# Patient Record
Sex: FEMALE | Race: WHITE | NOT HISPANIC OR LATINO | Employment: FULL TIME | URBAN - METROPOLITAN AREA
[De-identification: names, ages, dates, MRNs, and addresses within clinical notes are randomized per-mention and may not be internally consistent; named-entity substitution may affect disease eponyms.]

---

## 2017-02-24 ENCOUNTER — AMBULATORY - HEALTHEAST (OUTPATIENT)
Dept: NURSING | Facility: CLINIC | Age: 37
End: 2017-02-24

## 2017-02-24 DIAGNOSIS — Z23 NEED FOR INFLUENZA VACCINATION: ICD-10-CM

## 2017-03-17 ENCOUNTER — OFFICE VISIT - HEALTHEAST (OUTPATIENT)
Dept: FAMILY MEDICINE | Facility: CLINIC | Age: 37
End: 2017-03-17

## 2017-03-17 DIAGNOSIS — S29.011A INTERCOSTAL MUSCLE STRAIN: ICD-10-CM

## 2017-03-17 ASSESSMENT — MIFFLIN-ST. JEOR: SCORE: 1227.88

## 2017-12-13 ENCOUNTER — AMBULATORY - HEALTHEAST (OUTPATIENT)
Dept: NURSING | Facility: CLINIC | Age: 37
End: 2017-12-13

## 2017-12-13 DIAGNOSIS — Z23 NEED FOR INFLUENZA VACCINATION: ICD-10-CM

## 2018-04-17 ENCOUNTER — OFFICE VISIT - HEALTHEAST (OUTPATIENT)
Dept: FAMILY MEDICINE | Facility: CLINIC | Age: 38
End: 2018-04-17

## 2018-04-17 DIAGNOSIS — G43.009 MIGRAINE WITHOUT AURA AND WITHOUT STATUS MIGRAINOSUS, NOT INTRACTABLE: ICD-10-CM

## 2018-04-17 DIAGNOSIS — Z13.220 SCREENING, LIPID: ICD-10-CM

## 2018-04-17 DIAGNOSIS — S29.011A INTERCOSTAL MUSCLE STRAIN: ICD-10-CM

## 2018-04-17 DIAGNOSIS — Z13.0 SCREENING, ANEMIA, DEFICIENCY, IRON: ICD-10-CM

## 2018-04-17 DIAGNOSIS — D18.00 HEMANGIOMA: ICD-10-CM

## 2018-04-17 DIAGNOSIS — Z13.1 SCREENING FOR DIABETES MELLITUS: ICD-10-CM

## 2018-04-17 DIAGNOSIS — Z13.0 SCREENING FOR ENDOCRINE, NUTRITIONAL, METABOLIC AND IMMUNITY DISORDER: ICD-10-CM

## 2018-04-17 DIAGNOSIS — L91.8 CUTANEOUS SKIN TAGS: ICD-10-CM

## 2018-04-17 DIAGNOSIS — J01.00 ACUTE NON-RECURRENT MAXILLARY SINUSITIS: ICD-10-CM

## 2018-04-17 DIAGNOSIS — Z13.228 SCREENING FOR METABOLIC DISORDER: ICD-10-CM

## 2018-04-17 DIAGNOSIS — Z00.00 PREVENTATIVE HEALTH CARE: ICD-10-CM

## 2018-04-17 DIAGNOSIS — Z13.21 SCREENING FOR ENDOCRINE, NUTRITIONAL, METABOLIC AND IMMUNITY DISORDER: ICD-10-CM

## 2018-04-17 DIAGNOSIS — Z13.228 SCREENING FOR ENDOCRINE, NUTRITIONAL, METABOLIC AND IMMUNITY DISORDER: ICD-10-CM

## 2018-04-17 DIAGNOSIS — Z13.29 SCREENING FOR THYROID DISORDER: ICD-10-CM

## 2018-04-17 DIAGNOSIS — N39.46 URGE AND STRESS INCONTINENCE: ICD-10-CM

## 2018-04-17 DIAGNOSIS — R53.82 CHRONIC FATIGUE: ICD-10-CM

## 2018-04-17 DIAGNOSIS — Z23 NEED FOR TDAP VACCINATION: ICD-10-CM

## 2018-04-17 DIAGNOSIS — R06.2 WHEEZING: ICD-10-CM

## 2018-04-17 DIAGNOSIS — Z13.29 SCREENING FOR ENDOCRINE, NUTRITIONAL, METABOLIC AND IMMUNITY DISORDER: ICD-10-CM

## 2018-04-17 ASSESSMENT — MIFFLIN-ST. JEOR: SCORE: 1259.63

## 2018-04-17 NOTE — ASSESSMENT & PLAN NOTE
There are 4 skin tags noted today.  See exam.  I have recommended she come in for removal of these since they are getting caught on her clothing and when she is shaving.

## 2018-04-17 NOTE — ASSESSMENT & PLAN NOTE
Severe pain behind the eyes, notes a few times a month.  Notes tight shoulder muscles are triggers - starts in shoulders and wraps up head.     Naproxen with caffeine helps.

## 2018-04-17 NOTE — ASSESSMENT & PLAN NOTE
Flu shot - gets this every fall.   Pap: done 7/2014- repeat 2019 no hx of hpv. Neg hpv noted 2014  Mammo:  There is no family or personal history, not indicated  - adopted  Colonoscopy:  There is no family or personal history, not indicated   - adopted  Std testing desired:  offered  Osteoporosis prevention discussed.  vitamin d levels ordered. Recommend daily calcium and vitamin d intake to keep good bone health. Recommend weight bearing exercise, no tobacco, and limit alcohol  dexa - no indication age too young.   Recommend sunscreen, exercise, & healthy diet.  Offered tsh, glucose, hgb, lipid  I have had an Advance Directives discussion with the patient.   Body mass index is 21.97 kg/(m^2).   mychart active.

## 2018-05-03 ENCOUNTER — COMMUNICATION - HEALTHEAST (OUTPATIENT)
Dept: FAMILY MEDICINE | Facility: CLINIC | Age: 38
End: 2018-05-03

## 2018-05-11 ENCOUNTER — COMMUNICATION - HEALTHEAST (OUTPATIENT)
Dept: FAMILY MEDICINE | Facility: CLINIC | Age: 38
End: 2018-05-11

## 2018-07-02 ENCOUNTER — COMMUNICATION - HEALTHEAST (OUTPATIENT)
Dept: HEALTH INFORMATION MANAGEMENT | Facility: CLINIC | Age: 38
End: 2018-07-02

## 2018-07-02 ENCOUNTER — COMMUNICATION - HEALTHEAST (OUTPATIENT)
Dept: TELEHEALTH | Facility: CLINIC | Age: 38
End: 2018-07-02

## 2018-08-07 ENCOUNTER — COMMUNICATION - HEALTHEAST (OUTPATIENT)
Dept: SCHEDULING | Facility: CLINIC | Age: 38
End: 2018-08-07

## 2018-09-14 ENCOUNTER — AMBULATORY - HEALTHEAST (OUTPATIENT)
Dept: NURSING | Facility: CLINIC | Age: 38
End: 2018-09-14

## 2018-09-14 DIAGNOSIS — Z23 IMMUNIZATION DUE: ICD-10-CM

## 2019-07-30 ENCOUNTER — OFFICE VISIT - HEALTHEAST (OUTPATIENT)
Dept: FAMILY MEDICINE | Facility: CLINIC | Age: 39
End: 2019-07-30

## 2019-07-30 DIAGNOSIS — Z13.21 SCREENING FOR ENDOCRINE, NUTRITIONAL, METABOLIC AND IMMUNITY DISORDER: ICD-10-CM

## 2019-07-30 DIAGNOSIS — Z13.29 SCREENING FOR ENDOCRINE, NUTRITIONAL, METABOLIC AND IMMUNITY DISORDER: ICD-10-CM

## 2019-07-30 DIAGNOSIS — R10.2 PELVIC PAIN IN FEMALE: ICD-10-CM

## 2019-07-30 DIAGNOSIS — N39.46 URGE AND STRESS INCONTINENCE: ICD-10-CM

## 2019-07-30 DIAGNOSIS — Z13.0 SCREENING, ANEMIA, DEFICIENCY, IRON: ICD-10-CM

## 2019-07-30 DIAGNOSIS — Z12.31 ENCOUNTER FOR SCREENING MAMMOGRAM FOR MALIGNANT NEOPLASM OF BREAST: ICD-10-CM

## 2019-07-30 DIAGNOSIS — Z13.228 SCREENING FOR ENDOCRINE, NUTRITIONAL, METABOLIC AND IMMUNITY DISORDER: ICD-10-CM

## 2019-07-30 DIAGNOSIS — G44.209 TENSION HEADACHE: ICD-10-CM

## 2019-07-30 DIAGNOSIS — R53.82 CHRONIC FATIGUE: ICD-10-CM

## 2019-07-30 DIAGNOSIS — Z13.228 SCREENING FOR METABOLIC DISORDER: ICD-10-CM

## 2019-07-30 DIAGNOSIS — Z13.0 SCREENING FOR ENDOCRINE, NUTRITIONAL, METABOLIC AND IMMUNITY DISORDER: ICD-10-CM

## 2019-07-30 DIAGNOSIS — L91.8 CUTANEOUS SKIN TAGS: ICD-10-CM

## 2019-07-30 DIAGNOSIS — M79.643 PAIN OF HAND, UNSPECIFIED LATERALITY: ICD-10-CM

## 2019-07-30 DIAGNOSIS — Z12.4 SCREENING FOR MALIGNANT NEOPLASM OF CERVIX: ICD-10-CM

## 2019-07-30 DIAGNOSIS — Z13.220 SCREENING, LIPID: ICD-10-CM

## 2019-07-30 DIAGNOSIS — Z11.4 SCREENING FOR HIV WITHOUT PRESENCE OF RISK FACTORS: ICD-10-CM

## 2019-07-30 ASSESSMENT — MIFFLIN-ST. JEOR: SCORE: 1269.83

## 2019-07-30 NOTE — ASSESSMENT & PLAN NOTE
Skin tags which are bothersome noted in the following areas right neck( catches on necklace),  bra line (catches on bra strap), waistband line (catches on pant waist band), right groin (nicks when shaving), and left axilla (nicks when shaving)    Recommended excision, will make appt. For skin tag removal procedure.

## 2019-07-30 NOTE — ASSESSMENT & PLAN NOTE
Still present, wants to check routine labs for now, no further intervention requested today, follow up if desired.

## 2019-07-30 NOTE — ASSESSMENT & PLAN NOTE
No longer getting migraines, feels they are more tension headaches now, milder.  Recommended physical therapy, but she declined for now. Will watch and wait per her preference.

## 2019-07-31 ENCOUNTER — AMBULATORY - HEALTHEAST (OUTPATIENT)
Dept: LAB | Facility: CLINIC | Age: 39
End: 2019-07-31

## 2019-07-31 DIAGNOSIS — Z13.0 SCREENING FOR ENDOCRINE, NUTRITIONAL, METABOLIC AND IMMUNITY DISORDER: ICD-10-CM

## 2019-07-31 DIAGNOSIS — Z13.29 SCREENING FOR ENDOCRINE, NUTRITIONAL, METABOLIC AND IMMUNITY DISORDER: ICD-10-CM

## 2019-07-31 DIAGNOSIS — Z13.220 SCREENING, LIPID: ICD-10-CM

## 2019-07-31 DIAGNOSIS — Z13.21 SCREENING FOR ENDOCRINE, NUTRITIONAL, METABOLIC AND IMMUNITY DISORDER: ICD-10-CM

## 2019-07-31 DIAGNOSIS — Z13.228 SCREENING FOR ENDOCRINE, NUTRITIONAL, METABOLIC AND IMMUNITY DISORDER: ICD-10-CM

## 2019-07-31 DIAGNOSIS — Z13.0 SCREENING, ANEMIA, DEFICIENCY, IRON: ICD-10-CM

## 2019-07-31 DIAGNOSIS — Z13.228 SCREENING FOR METABOLIC DISORDER: ICD-10-CM

## 2019-07-31 LAB
ALBUMIN SERPL-MCNC: 4.1 G/DL (ref 3.5–5)
ALP SERPL-CCNC: 58 U/L (ref 45–120)
ALT SERPL W P-5'-P-CCNC: 9 U/L (ref 0–45)
ANION GAP SERPL CALCULATED.3IONS-SCNC: 9 MMOL/L (ref 5–18)
AST SERPL W P-5'-P-CCNC: 14 U/L (ref 0–40)
BASOPHILS # BLD AUTO: 0 THOU/UL (ref 0–0.2)
BASOPHILS NFR BLD AUTO: 0 % (ref 0–2)
BILIRUB SERPL-MCNC: 0.9 MG/DL (ref 0–1)
BKR LAB AP ABNORMAL BLEEDING: NO
BKR LAB AP BIRTH CONTROL/HORMONES: NORMAL
BKR LAB AP CERVICAL APPEARANCE: NORMAL
BKR LAB AP GYN ADEQUACY: NORMAL
BKR LAB AP GYN INTERPRETATION: NORMAL
BKR LAB AP HPV REFLEX: NORMAL
BKR LAB AP LMP: NORMAL
BKR LAB AP PATIENT STATUS: NORMAL
BKR LAB AP PREVIOUS ABNORMAL: NORMAL
BKR LAB AP PREVIOUS NORMAL: 2014
BUN SERPL-MCNC: 13 MG/DL (ref 8–22)
CALCIUM SERPL-MCNC: 9.8 MG/DL (ref 8.5–10.5)
CHLORIDE BLD-SCNC: 106 MMOL/L (ref 98–107)
CHOLEST SERPL-MCNC: 191 MG/DL
CO2 SERPL-SCNC: 23 MMOL/L (ref 22–31)
CREAT SERPL-MCNC: 0.75 MG/DL (ref 0.6–1.1)
EOSINOPHIL # BLD AUTO: 0.2 THOU/UL (ref 0–0.4)
EOSINOPHIL NFR BLD AUTO: 3 % (ref 0–6)
ERYTHROCYTE [DISTWIDTH] IN BLOOD BY AUTOMATED COUNT: 11.3 % (ref 11–14.5)
FASTING STATUS PATIENT QL REPORTED: YES
GFR SERPL CREATININE-BSD FRML MDRD: >60 ML/MIN/1.73M2
GLUCOSE BLD-MCNC: 85 MG/DL (ref 70–125)
HCT VFR BLD AUTO: 41.6 % (ref 35–47)
HDLC SERPL-MCNC: 71 MG/DL
HGB BLD-MCNC: 14.2 G/DL (ref 12–16)
HIGH RISK?: NO
HIV 1+2 AB+HIV1 P24 AG SERPL QL IA: NEGATIVE
LDLC SERPL CALC-MCNC: 101 MG/DL
LYMPHOCYTES # BLD AUTO: 1.6 THOU/UL (ref 0.8–4.4)
LYMPHOCYTES NFR BLD AUTO: 26 % (ref 20–40)
MCH RBC QN AUTO: 29.6 PG (ref 27–34)
MCHC RBC AUTO-ENTMCNC: 34.2 G/DL (ref 32–36)
MCV RBC AUTO: 87 FL (ref 80–100)
MONOCYTES # BLD AUTO: 0.3 THOU/UL (ref 0–0.9)
MONOCYTES NFR BLD AUTO: 5 % (ref 2–10)
NEUTROPHILS # BLD AUTO: 4.2 THOU/UL (ref 2–7.7)
NEUTROPHILS NFR BLD AUTO: 66 % (ref 50–70)
PATH REPORT.COMMENTS IMP SPEC: NORMAL
PLATELET # BLD AUTO: 200 THOU/UL (ref 140–440)
PMV BLD AUTO: 6.9 FL (ref 7–10)
POTASSIUM BLD-SCNC: 4.4 MMOL/L (ref 3.5–5)
PROT SERPL-MCNC: 7.1 G/DL (ref 6–8)
RBC # BLD AUTO: 4.8 MILL/UL (ref 3.8–5.4)
RESULT FLAG (HE HISTORICAL CONVERSION): NORMAL
SODIUM SERPL-SCNC: 138 MMOL/L (ref 136–145)
TRIGL SERPL-MCNC: 93 MG/DL
WBC: 6.3 THOU/UL (ref 4–11)

## 2019-08-01 ENCOUNTER — COMMUNICATION - HEALTHEAST (OUTPATIENT)
Dept: FAMILY MEDICINE | Facility: CLINIC | Age: 39
End: 2019-08-01

## 2019-08-01 DIAGNOSIS — E55.9 VITAMIN D DEFICIENCY: ICD-10-CM

## 2019-08-01 LAB
25(OH)D3 SERPL-MCNC: 27.5 NG/ML (ref 30–80)
25(OH)D3 SERPL-MCNC: 27.5 NG/ML (ref 30–80)

## 2019-08-06 ENCOUNTER — AMBULATORY - HEALTHEAST (OUTPATIENT)
Dept: FAMILY MEDICINE | Facility: CLINIC | Age: 39
End: 2019-08-06

## 2019-08-06 DIAGNOSIS — L91.8 SKIN TAG: ICD-10-CM

## 2019-08-06 ASSESSMENT — MIFFLIN-ST. JEOR: SCORE: 1261.67

## 2019-10-22 ENCOUNTER — OFFICE VISIT - HEALTHEAST (OUTPATIENT)
Dept: FAMILY MEDICINE | Facility: CLINIC | Age: 39
End: 2019-10-22

## 2019-10-22 DIAGNOSIS — R10.84 GENERALIZED ABDOMINAL PAIN: ICD-10-CM

## 2019-10-22 LAB
ALBUMIN SERPL-MCNC: 4 G/DL (ref 3.5–5)
ALBUMIN UR-MCNC: NEGATIVE MG/DL
ALP SERPL-CCNC: 50 U/L (ref 45–120)
ALT SERPL W P-5'-P-CCNC: 12 U/L (ref 0–45)
AMORPH CRY #/AREA URNS HPF: ABNORMAL /[HPF]
ANION GAP SERPL CALCULATED.3IONS-SCNC: 8 MMOL/L (ref 5–18)
APPEARANCE UR: ABNORMAL
AST SERPL W P-5'-P-CCNC: 14 U/L (ref 0–40)
BACTERIA #/AREA URNS HPF: ABNORMAL HPF
BASOPHILS # BLD AUTO: 0 THOU/UL (ref 0–0.2)
BASOPHILS NFR BLD AUTO: 1 % (ref 0–2)
BILIRUB SERPL-MCNC: 0.4 MG/DL (ref 0–1)
BILIRUB UR QL STRIP: NEGATIVE
BUN SERPL-MCNC: 16 MG/DL (ref 8–22)
CALCIUM SERPL-MCNC: 9.7 MG/DL (ref 8.5–10.5)
CHLORIDE BLD-SCNC: 103 MMOL/L (ref 98–107)
CO2 SERPL-SCNC: 24 MMOL/L (ref 22–31)
COLOR UR AUTO: YELLOW
CREAT SERPL-MCNC: 1.19 MG/DL (ref 0.6–1.1)
EOSINOPHIL # BLD AUTO: 0.2 THOU/UL (ref 0–0.4)
EOSINOPHIL NFR BLD AUTO: 4 % (ref 0–6)
ERYTHROCYTE [DISTWIDTH] IN BLOOD BY AUTOMATED COUNT: 12.5 % (ref 11–14.5)
GFR SERPL CREATININE-BSD FRML MDRD: 50 ML/MIN/1.73M2
GLUCOSE BLD-MCNC: 85 MG/DL (ref 70–125)
GLUCOSE UR STRIP-MCNC: NEGATIVE MG/DL
HCG SERPL-ACNC: <2 MLU/ML (ref 0–4)
HCT VFR BLD AUTO: 39.7 % (ref 35–47)
HGB BLD-MCNC: 13.3 G/DL (ref 12–16)
HGB UR QL STRIP: NEGATIVE
KETONES UR STRIP-MCNC: NEGATIVE MG/DL
LEUKOCYTE ESTERASE UR QL STRIP: NEGATIVE
LYMPHOCYTES # BLD AUTO: 2.1 THOU/UL (ref 0.8–4.4)
LYMPHOCYTES NFR BLD AUTO: 35 % (ref 20–40)
MCH RBC QN AUTO: 29.3 PG (ref 27–34)
MCHC RBC AUTO-ENTMCNC: 33.5 G/DL (ref 32–36)
MCV RBC AUTO: 87 FL (ref 80–100)
MONOCYTES # BLD AUTO: 0.4 THOU/UL (ref 0–0.9)
MONOCYTES NFR BLD AUTO: 7 % (ref 2–10)
MUCOUS THREADS #/AREA URNS LPF: ABNORMAL LPF
NEUTROPHILS # BLD AUTO: 3.4 THOU/UL (ref 2–7.7)
NEUTROPHILS NFR BLD AUTO: 54 % (ref 50–70)
NITRATE UR QL: NEGATIVE
PH UR STRIP: 7 [PH] (ref 4.5–8)
PLATELET # BLD AUTO: 206 THOU/UL (ref 140–440)
PMV BLD AUTO: 9.8 FL (ref 8.5–12.5)
POTASSIUM BLD-SCNC: 3.9 MMOL/L (ref 3.5–5)
PROT SERPL-MCNC: 7.3 G/DL (ref 6–8)
RBC # BLD AUTO: 4.54 MILL/UL (ref 3.8–5.4)
RBC #/AREA URNS AUTO: ABNORMAL HPF
SODIUM SERPL-SCNC: 135 MMOL/L (ref 136–145)
SP GR UR STRIP: 1.02 (ref 1–1.03)
SQUAMOUS #/AREA URNS AUTO: ABNORMAL LPF
UROBILINOGEN UR STRIP-ACNC: ABNORMAL
WBC #/AREA URNS AUTO: ABNORMAL HPF
WBC: 6.2 THOU/UL (ref 4–11)

## 2019-10-23 ENCOUNTER — AMBULATORY - HEALTHEAST (OUTPATIENT)
Dept: FAMILY MEDICINE | Facility: CLINIC | Age: 39
End: 2019-10-23

## 2019-10-23 DIAGNOSIS — R82.71 GROUP B STREPTOCOCCAL BACTERIURIA: ICD-10-CM

## 2019-10-23 LAB
BACTERIA SPEC CULT: ABNORMAL
BACTERIA SPEC CULT: ABNORMAL

## 2019-10-25 ENCOUNTER — COMMUNICATION - HEALTHEAST (OUTPATIENT)
Dept: FAMILY MEDICINE | Facility: CLINIC | Age: 39
End: 2019-10-25

## 2019-10-25 ENCOUNTER — HOSPITAL ENCOUNTER (OUTPATIENT)
Dept: CT IMAGING | Facility: HOSPITAL | Age: 39
Discharge: HOME | End: 2019-10-25
Attending: FAMILY MEDICINE

## 2019-10-25 DIAGNOSIS — E55.9 VITAMIN D DEFICIENCY: ICD-10-CM

## 2019-10-25 DIAGNOSIS — R10.84 GENERALIZED ABDOMINAL PAIN: ICD-10-CM

## 2019-12-31 ENCOUNTER — OFFICE VISIT - HEALTHEAST (OUTPATIENT)
Dept: FAMILY MEDICINE | Facility: CLINIC | Age: 39
End: 2019-12-31

## 2019-12-31 DIAGNOSIS — Z00.00 PREVENTATIVE HEALTH CARE: ICD-10-CM

## 2019-12-31 DIAGNOSIS — E87.1 HYPONATREMIA: ICD-10-CM

## 2019-12-31 DIAGNOSIS — R10.84 GENERALIZED ABDOMINAL PAIN: ICD-10-CM

## 2019-12-31 DIAGNOSIS — N39.0 URINARY TRACT INFECTION WITHOUT HEMATURIA, SITE UNSPECIFIED: ICD-10-CM

## 2019-12-31 DIAGNOSIS — E55.9 VITAMIN D DEFICIENCY: ICD-10-CM

## 2019-12-31 LAB
ALBUMIN UR-MCNC: NEGATIVE MG/DL
ANION GAP SERPL CALCULATED.3IONS-SCNC: 8 MMOL/L (ref 5–18)
APPEARANCE UR: CLEAR
BACTERIA #/AREA URNS HPF: ABNORMAL HPF
BILIRUB UR QL STRIP: NEGATIVE
BUN SERPL-MCNC: 13 MG/DL (ref 8–22)
CALCIUM SERPL-MCNC: 9.4 MG/DL (ref 8.5–10.5)
CHLORIDE BLD-SCNC: 106 MMOL/L (ref 98–107)
CO2 SERPL-SCNC: 26 MMOL/L (ref 22–31)
COLOR UR AUTO: YELLOW
CREAT SERPL-MCNC: 0.79 MG/DL (ref 0.6–1.1)
GFR SERPL CREATININE-BSD FRML MDRD: >60 ML/MIN/1.73M2
GLUCOSE BLD-MCNC: 87 MG/DL (ref 70–125)
GLUCOSE UR STRIP-MCNC: NEGATIVE MG/DL
HGB UR QL STRIP: ABNORMAL
KETONES UR STRIP-MCNC: NEGATIVE MG/DL
LEUKOCYTE ESTERASE UR QL STRIP: NEGATIVE
MUCOUS THREADS #/AREA URNS LPF: ABNORMAL LPF
NITRATE UR QL: NEGATIVE
PH UR STRIP: 7 [PH] (ref 5–8)
POTASSIUM BLD-SCNC: 3.7 MMOL/L (ref 3.5–5)
RBC #/AREA URNS AUTO: ABNORMAL HPF
SODIUM SERPL-SCNC: 140 MMOL/L (ref 136–145)
SP GR UR STRIP: 1.02 (ref 1–1.03)
SQUAMOUS #/AREA URNS AUTO: ABNORMAL LPF
UROBILINOGEN UR STRIP-ACNC: ABNORMAL
WBC #/AREA URNS AUTO: ABNORMAL HPF

## 2019-12-31 NOTE — ASSESSMENT & PLAN NOTE
Currently on vit d 5000 iu. Last vit d checked less than 3 months ago, will wait to recheck again to look for normalization

## 2019-12-31 NOTE — ASSESSMENT & PLAN NOTE
Chart reveiewed mammogram was done at [facility] for a left breast lump which turned out to be acyst.  No special follow-up is needed, routine mammograms should be done.

## 2020-01-02 LAB
25(OH)D3 SERPL-MCNC: 26.2 NG/ML (ref 30–80)
25(OH)D3 SERPL-MCNC: 26.2 NG/ML (ref 30–80)

## 2020-08-05 ENCOUNTER — COMMUNICATION - HEALTHEAST (OUTPATIENT)
Dept: FAMILY MEDICINE | Facility: CLINIC | Age: 40
End: 2020-08-05

## 2020-08-13 ENCOUNTER — COMMUNICATION - HEALTHEAST (OUTPATIENT)
Dept: FAMILY MEDICINE | Facility: CLINIC | Age: 40
End: 2020-08-13

## 2020-08-13 ASSESSMENT — ANXIETY QUESTIONNAIRES
5. BEING SO RESTLESS THAT IT IS HARD TO SIT STILL: NOT AT ALL
GAD7 TOTAL SCORE: 10
3. WORRYING TOO MUCH ABOUT DIFFERENT THINGS: MORE THAN HALF THE DAYS
7. FEELING AFRAID AS IF SOMETHING AWFUL MIGHT HAPPEN: NOT AT ALL
2. NOT BEING ABLE TO STOP OR CONTROL WORRYING: MORE THAN HALF THE DAYS
1. FEELING NERVOUS, ANXIOUS, OR ON EDGE: NEARLY EVERY DAY
6. BECOMING EASILY ANNOYED OR IRRITABLE: SEVERAL DAYS
4. TROUBLE RELAXING: MORE THAN HALF THE DAYS

## 2020-08-14 ENCOUNTER — OFFICE VISIT - HEALTHEAST (OUTPATIENT)
Dept: FAMILY MEDICINE | Facility: CLINIC | Age: 40
End: 2020-08-14

## 2020-08-14 DIAGNOSIS — F41.9 ANXIETY: ICD-10-CM

## 2020-08-14 DIAGNOSIS — E55.9 VITAMIN D DEFICIENCY: ICD-10-CM

## 2020-08-14 ASSESSMENT — ANXIETY QUESTIONNAIRES
GAD7 TOTAL SCORE: 10
1. FEELING NERVOUS, ANXIOUS, OR ON EDGE: NEARLY EVERY DAY
7. FEELING AFRAID AS IF SOMETHING AWFUL MIGHT HAPPEN: NOT AT ALL
4. TROUBLE RELAXING: MORE THAN HALF THE DAYS
5. BEING SO RESTLESS THAT IT IS HARD TO SIT STILL: NOT AT ALL
2. NOT BEING ABLE TO STOP OR CONTROL WORRYING: MORE THAN HALF THE DAYS
3. WORRYING TOO MUCH ABOUT DIFFERENT THINGS: MORE THAN HALF THE DAYS
6. BECOMING EASILY ANNOYED OR IRRITABLE: SEVERAL DAYS

## 2021-02-09 ENCOUNTER — COMMUNICATION - HEALTHEAST (OUTPATIENT)
Dept: FAMILY MEDICINE | Facility: CLINIC | Age: 41
End: 2021-02-09

## 2021-02-26 ENCOUNTER — OFFICE VISIT - HEALTHEAST (OUTPATIENT)
Dept: FAMILY MEDICINE | Facility: CLINIC | Age: 41
End: 2021-02-26

## 2021-02-26 DIAGNOSIS — G44.209 TENSION HEADACHE: ICD-10-CM

## 2021-02-26 DIAGNOSIS — G43.009 MIGRAINE WITHOUT AURA AND WITHOUT STATUS MIGRAINOSUS, NOT INTRACTABLE: ICD-10-CM

## 2021-02-26 NOTE — ASSESSMENT & PLAN NOTE
associated with her menses.  So she is wondering if these are migraines.  She says it really affected her eyes - and her eyes were really sensitive to light.     She says the worst headache was this week Tuesday - Thursday.  She says she had to stay in a quiet dark place.      Over the counter meds dont work.   She recently also started on ocps again due to heavy menses one month ago.     She is not having any aura.     Options  1. Physical therapy to help tension headaches  2. Controller med - zoloft, betablocker, or topamax.   3. Switch ocp to iud for less hormone fluctuation (vs ablation)  4. triptan as a rescue medication (there are multiple and there is some trial and error in choosing).    She tried mirena iud in the past and it did not work for the heavy menses. If ocp continues to aggravate the migraines she might also consider ablation which her ob had suggested.     Start zoloft 50 mg po q day  rx prn imitrex to try.   Follow up in 1 month.

## 2021-03-22 ENCOUNTER — COMMUNICATION - HEALTHEAST (OUTPATIENT)
Dept: FAMILY MEDICINE | Facility: CLINIC | Age: 41
End: 2021-03-22

## 2021-03-26 ENCOUNTER — OFFICE VISIT - HEALTHEAST (OUTPATIENT)
Dept: FAMILY MEDICINE | Facility: CLINIC | Age: 41
End: 2021-03-26

## 2021-03-26 ENCOUNTER — COMMUNICATION - HEALTHEAST (OUTPATIENT)
Dept: FAMILY MEDICINE | Facility: CLINIC | Age: 41
End: 2021-03-26

## 2021-03-26 DIAGNOSIS — G43.009 MIGRAINE WITHOUT AURA AND WITHOUT STATUS MIGRAINOSUS, NOT INTRACTABLE: ICD-10-CM

## 2021-03-26 DIAGNOSIS — N92.6 IRREGULAR MENSTRUAL BLEEDING: ICD-10-CM

## 2021-03-26 DIAGNOSIS — F41.9 ANXIETY: ICD-10-CM

## 2021-03-26 NOTE — ASSESSMENT & PLAN NOTE
Last time we talked she was going to go off the birth control  And she also sees an Obgyn who had put her on the ocp due to menorrhagia. She is considering an ablation, but her obgyn thought she might want to do a few more month of ocp prior to quitting ocp.

## 2021-03-26 NOTE — ASSESSMENT & PLAN NOTE
Headaches - maybe menses related  She started zoloft 50 mg 2/26/2021. She woke up one night and got a migraine, but since it was middle of the night she did not take the imitrex, but she did take imitrex in the morning and it seemed to help.  Then the headache seemed to flare up that night and so she took another imitrex the next day.    These headaches seem to have flared up since she started ocp to help control her periods. So because this could worsen migraines I do not think imaging is indicated especially since there is no complaint of focal abnormality.      We discussed to inject into outer upper arms or thighs into subcutaneous fat.

## 2021-03-26 NOTE — ASSESSMENT & PLAN NOTE
She does like the zoloft and feels anxiety is better. Will continue zoloft.   Wants to try higher dose of zoloft.   Discontinue zoloft 50 mg po q day  Start zoloft 100 mg po q day.   Follow up in 1 month.

## 2021-03-29 ENCOUNTER — COMMUNICATION - HEALTHEAST (OUTPATIENT)
Dept: FAMILY MEDICINE | Facility: CLINIC | Age: 41
End: 2021-03-29

## 2021-03-29 DIAGNOSIS — G43.009 MIGRAINE WITHOUT AURA AND WITHOUT STATUS MIGRAINOSUS, NOT INTRACTABLE: ICD-10-CM

## 2021-04-03 ENCOUNTER — COMMUNICATION - HEALTHEAST (OUTPATIENT)
Dept: SCHEDULING | Facility: CLINIC | Age: 41
End: 2021-04-03

## 2021-04-27 ENCOUNTER — OFFICE VISIT - HEALTHEAST (OUTPATIENT)
Dept: FAMILY MEDICINE | Facility: CLINIC | Age: 41
End: 2021-04-27

## 2021-04-27 DIAGNOSIS — G43.009 MIGRAINE WITHOUT AURA AND WITHOUT STATUS MIGRAINOSUS, NOT INTRACTABLE: ICD-10-CM

## 2021-04-27 DIAGNOSIS — G44.209 TENSION HEADACHE: ICD-10-CM

## 2021-04-27 NOTE — ASSESSMENT & PLAN NOTE
imitrex helpful but she is using it quite frequently. Needs better control of migraines if possible.   She took all four imitrex injectiosn in one week.     TheZoloft helps, she did try the 100 but reverted to the 50 (only gave it one week, so might try again). She thinks she likes the 50 mg dose. She thought it made her heart race at the higher dose, but is not sure.     She now knows that the neck tension clearly is the first sign of a migraine.     Discussed option of physical therapy to help neck tension which is likely triggering her migraines.     We could also referto neurology to consider other options and maybe botox.     She recently stopped her ocps (within a month) so that may also help.

## 2021-05-25 ENCOUNTER — RECORDS - HEALTHEAST (OUTPATIENT)
Dept: ADMINISTRATIVE | Facility: CLINIC | Age: 41
End: 2021-05-25

## 2021-05-27 ENCOUNTER — RECORDS - HEALTHEAST (OUTPATIENT)
Dept: ADMINISTRATIVE | Facility: CLINIC | Age: 41
End: 2021-05-27

## 2021-05-27 ENCOUNTER — RECORDS - HEALTHEAST (OUTPATIENT)
Dept: FAMILY MEDICINE | Facility: CLINIC | Age: 41
End: 2021-05-27

## 2021-05-28 ASSESSMENT — ANXIETY QUESTIONNAIRES
GAD7 TOTAL SCORE: 10
GAD7 TOTAL SCORE: 10

## 2021-05-30 VITALS — WEIGHT: 125 LBS | BODY MASS INDEX: 20.83 KG/M2 | HEIGHT: 65 IN

## 2021-05-31 ENCOUNTER — RECORDS - HEALTHEAST (OUTPATIENT)
Dept: ADMINISTRATIVE | Facility: CLINIC | Age: 41
End: 2021-05-31

## 2021-05-31 NOTE — PROGRESS NOTES
Procedures   S: The patient complains of symptomatic skin tags on the right neck, left axilla, mid back at bra line, left waist line at waist band line, and left groin. These are irritated by clothing, jewelry, shaving and rubbing.    O: Patient appears well. 5 benign skin tags are noted on the neck, left axilla, back, left waist, and right groin.     A: Skin tags     P: Skin tags are removed after lidocaine (as they all had 3-4 mm bases using etoh for cleansing and electrocautery (care was taken to ensure all alcohol was dried prior to using electrocautery). Local anesthesia in the form of injectable 1% lidocaine with epinephrine was used, approximately 0.25 cc was used per lesion. These pathognomonic lesions are not sent for pathology.

## 2021-06-01 VITALS — HEIGHT: 65 IN | BODY MASS INDEX: 21.99 KG/M2 | WEIGHT: 132 LBS

## 2021-06-02 NOTE — TELEPHONE ENCOUNTER
RN cannot approve Refill Request    RN can NOT refill this medication med is not covered by policy/route to provider. Last office visit: 10/22/2019 Yris Frazier MD Last Physical: 7/30/2019 Last MTM visit: Visit date not found Last visit same specialty: 10/22/2019 Yris Frazier MD.  Next visit within 3 mo: Visit date not found  Next physical within 3 mo: Visit date not found      Carla Ferris, [facility] Triage/Med Refill 10/25/2019    Requested Prescriptions   Pending Prescriptions Disp Refills     cholecalciferol, vitamin D3, 5,000 unit capsule [Pharmacy Med Name: VITAMIN D3 5,000 UNIT CAPSULE] 90 capsule 0     Sig: TAKE 1 CAPSULE (5,000 UNITS TOTAL) BY MOUTH DAILY.       There is no refill protocol information for this order

## 2021-06-02 NOTE — PATIENT INSTRUCTIONS - HE
Return in about 2 weeks (around 11/5/2019) for result review if any abnormals were noted/ or if pain persists.

## 2021-06-02 NOTE — PROGRESS NOTES
ASSESSMENT AND PLAN:      Problem List Items Addressed This Visit        Unprioritized    Generalized abdominal pain - Primary     PERSISTENT INTERMITTENT ABDOMINAL PAIN  Reviewed records from [facility] 10/10/19.  ER notes show that the patient had a spontaneous miscarriage in early September (which might explain pelvic pain she complained about in July - this was unexpected because she is monagamous with her  and he had a vasectomy).  She then presented with abdominal cramping in early October.  She has had some loose stools along with this prior to her presentation.  She thought maybe she was getting her period back after her miscarriage.  Her pain improved with Aleve.  At that time she had a UA, normal white blood count, normal hemoglobin, normal lipase, no CVA tenderness and a normal pelvic ultrasound.  It was recommended that she get a CT scan to look for kidney stones but she declined that at that time.  Since then she has had 2 more episodes of abdominal/back pain.  One episode was in the left flank and coming around to the left abdomen and the other episode was actually in the right flank area.    On exam today she has no specific pain but she has some diffuse tenderness with palpation.  Subjectively she is actually not having any pain.     I would like to rule out ongoing elevation of hCG levels although it is noted that she had a normal ultrasound with no retained products of conception in the emergency room on 10/10/2019.  I would like to get a CT scan to look for stones, a urine to look for blood/urinary tract infection, CBC to look for infection, CMP to look for liver abnormalities or abnormal kidney function and a chest x-ray to look for possible pneumonia.      Will check hCG to ensure that it has returned to 0, a CT stone run, UA/UC, CBC, CMP and are all be undertaken.    The results are normal and the patient has no more pain we can watch her weight.  However if anything is abnormal or her  pain persists follow-up will be needed. She understands.    Because of her recent miscarriage despite her  having had vasectomy I have recommended that her  see his physician as well.         Relevant Orders    XR Chest 2 Views (Completed)    Beta-hCG, Quantitative    CT Abdomen Pelvis Without Oral Without IV Contrast    HM1(CBC and Differential)    Comprehensive Metabolic Panel    Urinalysis    Culture, Urine    HM1 (CBC with Diff)           Chief Complaint   Patient presents with     on and off flank pain     some left leg pain. Did go to [facility] a few weeks ago for same sx. Had another attack Thursday and Sunday. pain scale around a 4 then but an 8 the first time.        HPI  Diana Pierce is a 39 y.o. female comes in today to follow-up on the visit at [facility] 10/10/2019.  Since then she has had ongoing intermittent back and abdominal pain.  She says that    In September she found out she was pregnant despite her  having had a vasectomy so pregnancy was quite unexpected.  She ended up having a miscarriage and had bleeding for approximately 2 weeks around September 13.  She is currently abstaining from intercourse and understands that she needs to discuss contraception.    Over the last week she has had some left flank pain which is described as achy and crampy that radiated around to the front of her belly.  She also has had loose stools 2 or 3 times a day with intermittent formed stools which are soft but not liquid.    Social History     Tobacco Use   Smoking Status Never Smoker   Smokeless Tobacco Never Used      Current Outpatient Medications on File Prior to Visit   Medication Sig Dispense Refill     cholecalciferol, vitamin D3, 5,000 unit capsule Take 1 capsule (5,000 Units total) by mouth daily. 90 capsule 0     MULTIVITAMIN (MULTIPLE VITAMIN ORAL) Take by mouth.       No current facility-administered medications on file prior to visit.       No Known Allergies      Review  of Systems   Constitutional: Negative.    HENT: Negative.    Eyes: Negative.    Respiratory: Negative.    Cardiovascular: Negative.    Gastrointestinal: Negative.    Endocrine: Negative.    Genitourinary: Negative.    Musculoskeletal: Negative.    Skin: Negative.    Neurological: Negative.    Hematological: Negative.    Psychiatric/Behavioral: Negative.         OBJECTIVE: /60 (Patient Site: Left Arm, Patient Position: Sitting, Cuff Size: Adult Regular)   Pulse 64   Wt 135 lb 8 oz (61.5 kg)   BMI 22.90 kg/m     Physical Exam  Constitutional:       General: She is not in acute distress.     Appearance: She is well-developed.   HENT:      Head: Normocephalic and atraumatic.   Eyes:      Conjunctiva/sclera: Conjunctivae normal.   Neck:      Musculoskeletal: Neck supple.   Cardiovascular:      Rate and Rhythm: Normal rate and regular rhythm.   Pulmonary:      Effort: Pulmonary effort is normal.   Abdominal:      General: Bowel sounds are decreased. There is no distension.      Palpations: Abdomen is soft.      Tenderness: There is generalized tenderness. There is no right CVA tenderness, left CVA tenderness, guarding or rebound.      Hernia: No hernia is present.   Musculoskeletal: Normal range of motion.   Skin:     General: Skin is warm and dry.   Neurological:      Mental Status: She is alert and oriented to person, place, and time.          cxr personally reviewed - no pneumonia, normal costophrenic angles. Normal breath, no sign of trauma, no stones seen.    Additional History from Old Records Summarized (2): yes  Decision to Obtain Records (1): yes  Radiology Tests Summarized or Ordered (1): yes  Labs Reviewed or Ordered (1): yes  Medicine Test Summarized or Ordered (1): yes  Independent Review of EKG or X-RAY(2 each): yes    This note was created using Dragon dictation.  Please excuse any grammatical errors.

## 2021-06-03 VITALS
BODY MASS INDEX: 22.9 KG/M2 | HEART RATE: 64 BPM | DIASTOLIC BLOOD PRESSURE: 60 MMHG | SYSTOLIC BLOOD PRESSURE: 100 MMHG | WEIGHT: 135.5 LBS

## 2021-06-03 VITALS — WEIGHT: 136 LBS | HEIGHT: 65 IN | BODY MASS INDEX: 22.66 KG/M2

## 2021-06-03 VITALS — WEIGHT: 134.2 LBS | HEIGHT: 65 IN | BODY MASS INDEX: 22.36 KG/M2

## 2021-06-04 VITALS
DIASTOLIC BLOOD PRESSURE: 68 MMHG | SYSTOLIC BLOOD PRESSURE: 112 MMHG | BODY MASS INDEX: 22.98 KG/M2 | WEIGHT: 136 LBS | HEART RATE: 70 BPM

## 2021-06-04 NOTE — PROGRESS NOTES
ASSESSMENT AND PLAN:      Problem List Items Addressed This Visit        Unprioritized    Preventative health care     Chart reveiewed mammogram was done at [facility] 2019 for a left breast lump which turned out to be a cyst.  No special follow-up is needed, routine mammograms should be done.         Vitamin D deficiency     Currently on vit d 5000 iu. Last vit d checked less than 3 months ago, will wait to recheck again to look for normalization         Hyponatremia     Normalized.          RESOLVED: Generalized abdominal pain     RESOLVED  Didn't need gynecology referral. Spontaneously resolved w abx. Now feels good.       A repeat UA was essentially normal today, there was small blood noted on the dip but no red blood cells noted on the micro.    Bmp has also normalized!    It seems all abnormalities were likely related to a uti.           Other Visit Diagnoses     UTI of     -  Primary    Urinary tract infection without hematuria, site unspecified        Relevant Orders    Urinalysis-UC if Indicated (Completed)    Basic Metabolic Panel (Completed)           Chief Complaint   Patient presents with     follow up labs        HPI  Diana Pierce is a 39 y.o. female comes in today to follow-up on abdominal pain elevated creatinine and abnormal urine test.  All of these things have improved and she is here to get her urine retested and her creatinine rechecked.  She took antibiotics and since then she has had no pain and feels good.  It seems that her symptoms were all from urinary tract infection.  She did not schedule with a gynecologist because it did not seem to be needed.    Chart reviewed:  phone encounter , gyn tried scheduling w her but she did not do it.  19 ess nl cbc.   2019 nl pap and neg hiv  19 d 27.5 low, need increase vit d intake. (add vit d to prob list)  10/23/2019 hcg normalized. nl cbc, elevated creat (await ct - ? stone), slight low sodium    Social History     Tobacco  Use   Smoking Status Never Smoker   Smokeless Tobacco Never Used      Current Outpatient Medications on File Prior to Visit   Medication Sig Dispense Refill     cholecalciferol, vitamin D3, 5,000 unit capsule TAKE 1 CAPSULE (5,000 UNITS TOTAL) BY MOUTH DAILY. 90 capsule 0     MULTIVITAMIN (MULTIPLE VITAMIN ORAL) Take by mouth.       No current facility-administered medications on file prior to visit.       No Known Allergies      Review of Systems   Constitutional: Negative.    HENT: Negative.    Eyes: Negative.    Respiratory: Negative.    Cardiovascular: Negative.    Gastrointestinal: Negative.    Endocrine: Negative.    Genitourinary: Negative.    Musculoskeletal: Negative.    Skin: Negative.    Neurological: Negative.    Hematological: Negative.    Psychiatric/Behavioral: Negative.         OBJECTIVE: /68 (Patient Site: Left Arm, Patient Position: Sitting, Cuff Size: Adult Regular)   Pulse 70   Wt 136 lb (61.7 kg)   BMI 22.98 kg/m     Physical Exam  Constitutional:       General: She is not in acute distress.     Appearance: She is well-developed.   HENT:      Head: Normocephalic and atraumatic.   Eyes:      Conjunctiva/sclera: Conjunctivae normal.   Neck:      Musculoskeletal: Neck supple.   Cardiovascular:      Rate and Rhythm: Normal rate and regular rhythm.   Pulmonary:      Effort: Pulmonary effort is normal.   Musculoskeletal: Normal range of motion.   Skin:     General: Skin is warm and dry.   Neurological:      Mental Status: She is alert and oriented to person, place, and time.          Additional History from Old Records Summarized (2): yes  Decision to Obtain Records (1): no  Radiology Tests Summarized or Ordered (1): no  Labs Reviewed or Ordered (1): yes  Medicine Test Summarized or Ordered (1): no  Independent Review of EKG or X-RAY(2 each): no    This note was created using Dragon dictation.  Please excuse any grammatical errors.

## 2021-06-15 NOTE — PROGRESS NOTES
Diana Pierce is a 40 y.o. female who is being evaluated via a billable video visit.      How would you like to obtain your AVS? MyChart.  If dropped from the video visit, the video invitation should be resent by: Text to cell phone: [phone]   Will anyone else be joining your video visit? No    Video Start Time: 12:51 PM    Problem List Items Addressed This Visit        High    Tension headache     See migraine headache in problem list.         Relevant Medications    sertraline (ZOLOFT) 50 MG tablet    SUMAtriptan (IMITREX) 50 MG tablet       Unprioritized    Migraine without aura and without status migrainosus, not intractable - Primary     associated with her menses.  So she is wondering if these are migraines.  She says it really affected her eyes - and her eyes were really sensitive to light.     She says the worst headache was this week Tuesday - Thursday.  She says she had to stay in a quiet dark place.      Over the counter meds dont work.   She recently also started on ocps again due to heavy menses one month ago.     She is not having any aura.     Options  1. Physical therapy to help tension headaches  2. Controller med - zoloft, betablocker, or topamax.   3. Switch ocp to iud for less hormone fluctuation (vs ablation)  4. triptan as a rescue medication (there are multiple and there is some trial and error in choosing).    She tried mirena iud in the past and it did not work for the heavy menses. If ocp continues to aggravate the migraines she might also consider ablation which her ob had suggested.     Start zoloft 50 mg po q day  rx prn imitrex to try.   Follow up in 1 month.              Relevant Medications    sertraline (ZOLOFT) 50 MG tablet    SUMAtriptan (IMITREX) 50 MG tablet           Subjective   Diana Pierce is 40 y.o. and presents today for the following health issues  Hx of tension headaches, but now complains of new headaches lasting 2-3 days, she tried caffiene, aleve, tylenol and  couldn't get them to go away.     At first she thought they were stress related.   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  Eyes always on the computer    Later she noted they were associated with her menses.  So she is wondering if these are migraines.  She says it really affected her eyes - and her eyes were really sensitive to light.     She says the worst headache was this week Tuesday - Thursday.  She says she had to stay in a quiet dark place.      Over the counter meds dont work.   She recently also started on ocps again due to heavy menses one month ago.     She is not having any aura.     Options  1. Physical therapy to help tension headaches  2. Controller med - zoloft, betablocker, or topamax.   3. Switch ocp to iud for less hormone fluctuation (vs ablation)  4. triptan as a rescue medication (there are multiple and there is some trial and error in choosing).    She tried mirena iud in the past and it did not work for the heavy menses. If ocp continues to aggravate the migraines she might also consider ablation which her ob had emetine            Objective       LMP 02/19/2021 (Exact Date)   Breastfeeding No    Physical Exam   Constitutional: She is oriented to person, place, and time. She appears well-nourished.   HENT:   Head: Normocephalic.   Eyes: Pupils are equal, round, and reactive to light. Conjunctivae and EOM are normal.   Neck: Normal range of motion. Neck supple.   Pulmonary/Chest: Effort normal.   Musculoskeletal: Normal range of motion.   Neurological: She is alert and oriented to person, place, and time.   Psychiatric: She has a normal mood and affect. Her behavior is normal. Judgment and thought content normal.   Nursing note and vitals reviewed.         Video-Visit Details    Type of service:  Video Visit -1:20pm  Video End Time (time video stopped):   Originating Location (pt. Location): Home    Distant Location (provider location):  [facility]      Platform used for Video Visit: Matilda

## 2021-06-16 PROBLEM — R82.71 GROUP B STREPTOCOCCAL BACTERIURIA: Status: ACTIVE | Noted: 2019-10-23

## 2021-06-16 PROBLEM — Z00.00 PREVENTATIVE HEALTH CARE: Status: ACTIVE | Noted: 2018-04-17

## 2021-06-16 PROBLEM — N39.46 URGE AND STRESS INCONTINENCE: Status: ACTIVE | Noted: 2018-04-17

## 2021-06-16 PROBLEM — E55.9 VITAMIN D DEFICIENCY: Status: ACTIVE | Noted: 2020-01-02

## 2021-06-16 PROBLEM — L91.8 CUTANEOUS SKIN TAGS: Status: ACTIVE | Noted: 2018-04-17

## 2021-06-16 PROBLEM — F41.9 ANXIETY: Status: ACTIVE | Noted: 2020-08-14

## 2021-06-16 PROBLEM — G43.009 MIGRAINE WITHOUT AURA AND WITHOUT STATUS MIGRAINOSUS, NOT INTRACTABLE: Status: ACTIVE | Noted: 2021-03-01

## 2021-06-16 NOTE — TELEPHONE ENCOUNTER
Incoming call from pharmacy about imitrex Rx. They said they do not typical dispense the vial where the pt has to draw it up. They typically dispense the auto injector. I pended the correct order if you want to resend

## 2021-06-16 NOTE — PROGRESS NOTES
Diana Pierce is a 40 y.o. female who is being evaluated via a billable video visit.      How would you like to obtain your AVS? MyChart.  If dropped from the video visit, the video invitation should be resent by: Send to e-mail at: [email or web address]  Will anyone else be joining your video visit? No    Video Start Time: 2:11 PM    Problem List Items Addressed This Visit        Unprioritized    Irregular menstrual bleeding     Last time we talked she was going to go off the birth control  And she also sees an Obgyn who had put her on the ocp due to menorrhagia. She is considering an ablation, but her obgyn thought she might want to do a few more month of ocp prior to quitting ocp.          Anxiety - Primary     She does like the zoloft and feels anxiety is better. Will continue zoloft.   Wants to try higher dose of zoloft.   Discontinue zoloft 50 mg po q day  Start zoloft 100 mg po q day.   Follow up in 1 month.          Relevant Medications    sertraline (ZOLOFT) 100 MG tablet    Migraine without aura and without status migrainosus, not intractable     Headaches - maybe menses related  She started zoloft 50 mg 2/26/2021. She woke up one night and got a migraine, but since it was middle of the night she did not take the imitrex, but she did take imitrex in the morning and it seemed to help.  Then the headache seemed to flare up that night and so she took another imitrex the next day.    These headaches seem to have flared up since she started ocp to help control her periods. So because this could worsen migraines I do not think imaging is indicated especially since there is no complaint of focal abnormality.      We discussed to inject into outer upper arms or thighs into subcutaneous fat.          Relevant Medications    SUMAtriptan injection 6 mg (IMITREX)    sertraline (ZOLOFT) 100 MG tablet           Subjective   Diana Pierce is 40 y.o. and presents today for the following health issues headaches,  anxiety, zoloft, and irregular menses.          Objective       Vitals:  No vitals were obtained today due to virtual visit.    Physical Exam   Constitutional: She is oriented to person, place, and time. She appears well-developed and well-nourished.   HENT:   Head: Normocephalic and atraumatic.   Pulmonary/Chest: Effort normal.   Musculoskeletal: Normal range of motion.   Neurological: She is alert and oriented to person, place, and time.   Psychiatric: She has a normal mood and affect. Her behavior is normal. Judgment and thought content normal.     Video-Visit Details    Type of service:  Video Visit    Video End Time (time video stopped): 2:37 PM  Originating Location (pt. Location): Home    Distant Location (provider location):  [facility]     Platform used for Video Visit: [employer] 3[employer]

## 2021-06-17 NOTE — PROGRESS NOTES
Assessment:      Healthy female exam.      Plan:     Problem List Items Addressed This Visit     Common Migraine (Without Aura)     Severe pain behind the eyes, notes a few times a month.  Notes tight shoulder muscles are triggers - starts in shoulders and wraps up head.     Naproxen with caffeine helps.         Hemangioma     Left inner thigh         Chronic fatigue     Wants labs to look at this more carefully.         Relevant Orders    Comprehensive Metabolic Panel    HM1(CBC and Differential)    Thyroid Cascade    Vitamin D, Total (25-Hydroxy)    Preventative health care - Primary     Flu shot - gets this every fall.   Pap: done 7/2014 - repeat 2019 no hx of hpv. Neg hpv noted 2014  Mammo:  There is no family or personal history, not indicated  - adopted  Colonoscopy:  There is no family or personal history, not indicated   - adopted  Std testing desired:  offered  Osteoporosis prevention discussed.  vitamin d levels ordered. Recommend daily calcium and vitamin d intake to keep good bone health. Recommend weight bearing exercise, no tobacco, and limit alcohol  dexa - no indication age too young.   Recommend sunscreen, exercise, & healthy diet.  Offered tsh, glucose, hgb, lipid  I have had an Advance Directives discussion with the patient.   Body mass index is 21.97 kg/(m^2).   mychart active.           Urge and stress incontinence     Referral to urogyn.         Relevant Orders    Ambulatory referral to Gynecology    Cutaneous skin tags     There are 4 skin tags noted today.  See exam.  I have recommended she come in for removal of these since they are getting caught on her clothing and when she is shaving.         RESOLVED: Wheezing (Symptom)     Only when ill.          RESOLVED: Acute maxillary sinusitis     Not currently, comes and goes.          RESOLVED: Intercostal muscle strain     Comes and goes, but better now          Relevant Orders    Comprehensive Metabolic Panel      Other Visit Diagnoses      Screening for metabolic disorder        Relevant Orders    HM1(CBC and Differential)    Screening, anemia, deficiency, iron        Relevant Orders    Lipid Cascade    Screening, lipid        Screening for thyroid disorder        Relevant Orders    Thyroid [place]    Screening for endocrine, nutritional, metabolic and immunity disorder        Relevant Orders    Vitamin D, Total (25-Hydroxy)    Screening for diabetes mellitus        Need for Tdap vaccination        Relevant Orders    Tdap vaccine,  6yo or older,  IM (Completed)            I have had an Advance Directives discussion with the patient.     Subjective:      Diana Pierce is a 37 y.o. female who presents for an annual exam. The patient is sexually active. The patient participates in regular exercise: yes. The patient reports that there is not domestic violence in her life.     She wants to talk about fatigue.  She says she seems to get enough rest but she does not pop out of bed and does not feel motivated to do all the things she wants to do although she is able to force herself to do those things.  She would like to see if she can get her thyroid and vitamin D levels tested today and see if she can get to the bottom of this.  She also has some skin tags that she would like removed.    Healthy Habits:   Regular Exercise: Yes  Sunscreen Use: Yes  Healthy Diet: Yes  Dental Visits Regularly: Yes  Seat Belt: Yes  Sexually active: Yes  Self Breast Exam Monthly:No  Hemoccults: No  Flex Sig: No  Colonoscopy: No  Lipid Profile: Yes  Glucose Screen: Yes  Prevention of Osteoporosis: Yes  Last Dexa: No  Guns at Home:  Yes  Guns Safety Locks:  Yes      Immunization History   Administered Date(s) Administered     Hep B, Peds or Adolescent 07/14/1997, 08/14/1997, 03/12/1998     Influenza, inj, historic,unspecified 11/02/2012     Influenza, seasonal,quad inj 36+ mos 12/13/2017     Influenza, seasonal,quad inj 6-35 mos 11/02/2012, 11/15/2013     Influenza,seasonal quad,  PF, 36+MOS 2017     MMR 1980, 1992     Td, adult adsorbed, PF 06/15/2005     Tdap 2018     Immunization status: due today. Tdap due.     No exam data present    Gynecologic History  Patient's last menstrual period was 2018 (exact date).  Contraception: OCP (estrogen/progesterone)  Last Pap: . Results were: normal  Last mammogram: N/A. Results were: N/A      OB History    Para Term  AB Living   5 3 3  2 3   SAB TAB Ectopic Multiple Live Births   2          # Outcome Date GA Lbr Kojo/2nd Weight Sex Delivery Anes PTL Lv   5 Term 11    F       4 Term 09    M       3 Term 07    M       2 SAB            1 SAB                   Current Outpatient Prescriptions   Medication Sig Dispense Refill     MULTIVITAMIN (MULTIPLE VITAMIN ORAL) Take by mouth.       No current facility-administered medications for this visit.      Past Medical History:   Diagnosis Date     Acute maxillary sinusitis      Acute upper respiratory infections of unspecified site      H/O dilation and curettage      Hemangioma of unspecified site      Migraine without aura, without mention of intractable migraine without mention of status migrainosus      Other malaise and fatigue      Pain in limb      Pneumonia     recurrent in childhood     Wheezing      Past Surgical History:   Procedure Laterality Date     NASAL SEPTUM SURGERY       NC DILATION/CURETTAGE,DIAGNOSTIC      Description: Dilation And Curettage;  Recorded: 2014;     Review of patient's allergies indicates no known allergies.  Family History   Problem Relation Age of Onset     Adopted: Yes     Social History     Social History     Marital status:      Spouse name: Shay     Number of children: 3     Years of education: bachelors - going back masters     Occupational History           [employer]     Social History Main Topics     Smoking status: Never Smoker     Smokeless tobacco: Never Used  "    Alcohol use 0.0 - 0.6 oz/week     0 - 1 Glasses of wine per week     Drug use: No     Sexual activity: Yes     Partners: Male     Birth control/ protection: Surgical      Comment:  had vasectomy     Other Topics Concern     Not on file     Social History Narrative     No narrative on file       Review of Systems   Review of Systems   Constitutional: Negative.    HENT: Negative.    Eyes: Negative.    Respiratory: Negative.    Cardiovascular: Negative.    Gastrointestinal: Negative.    Endocrine: Negative.    Genitourinary: Negative.         Describes incontinence with running/ jumping.    Musculoskeletal: Negative.    Skin: Negative.    Neurological: Negative.    Hematological: Negative.    Psychiatric/Behavioral: Negative.              Objective:         Vitals:    04/17/18 1437   BP: 108/70   Pulse: 76   Resp: 14   Weight: 132 lb (59.9 kg)   Height: 5' 5\" (1.651 m)     Body mass index is 21.97 kg/(m^2).    Physical   Physical Exam   Constitutional: She is oriented to person, place, and time. She appears well-developed and well-nourished.   HENT:   Head: Normocephalic and atraumatic.   Right Ear: External ear normal.   Left Ear: External ear normal.   Nose: Nose normal.   Mouth/Throat: Oropharynx is clear and moist.   Eyes: Conjunctivae are normal. Pupils are equal, round, and reactive to light.   Neck: Neck supple.   Cardiovascular: Normal rate, regular rhythm and normal heart sounds.    Pulmonary/Chest: Effort normal and breath sounds normal. Right breast exhibits no inverted nipple, no mass, no nipple discharge, no skin change and no tenderness. Left breast exhibits no inverted nipple, no mass, no nipple discharge, no skin change and no tenderness. Breasts are symmetrical.   Abdominal: Soft.   Genitourinary:   Genitourinary Comments: Declined exam.  She is not due for a Pap this year.   Musculoskeletal: Normal range of motion.   Neurological: She is alert and oriented to person, place, and time. "   Skin: Skin is warm and dry.   4 skin tags noted each is 5 mm and pedunculated.  There is one on her right posterior neck which gets caught on her necklace, one on her left torso in the axillary line adjacent to the breast that sometimes gets nicked when she is shaving her armpit.  One on the left lower torso that gets caught on her clothing.  And one on her right inner thigh/groin area that gets caught when she is shaving pubic hair.   Psychiatric: She has a normal mood and affect. Her behavior is normal. Judgment and thought content normal.

## 2021-06-17 NOTE — PROGRESS NOTES
Diana Pierce is a 40 y.o. female who is being evaluated via a billable video visit.      How would you like to obtain your AVS? MyChart.  If dropped from the video visit, the video invitation should be resent by: Text to cell phone: [phone]  Will anyone else be joining your video visit? No    Video Start Time: 3:50 PM    Problem List Items Addressed This Visit        High    Tension headache - Primary     Trial physical therapy, see migraines for much more detail in problem list.          Relevant Medications    SUMAtriptan succinate (IMITREX STATDOSE) 6 mg/0.5 mL kit    Other Relevant Orders    Ambulatory referral to Adult PT- Internal    Ambulatory referral to Neurology       Unprioritized    Migraine without aura and without status migrainosus, not intractable     imitrex helpful but she is using it quite frequently. Needs better control of migraines if possible.   She took all four imitrex injectiosn in one week.     The Zoloft helps, she did try the 100 but reverted to the 50 (only gave it one week, so might try again). She thinks she likes the 50 mg dose. She thought it made her heart race at the higher dose, but is not sure.     She now knows that the neck tension clearly is the first sign of a migraine.     Discussed option of physical therapy to help neck tension which is likely triggering her migraines.     We could also refer to neurology to consider other options and maybe botox.     She recently stopped her ocps (within a month) so that may also help.             Relevant Medications    SUMAtriptan succinate (IMITREX STATDOSE) 6 mg/0.5 mL kit    Other Relevant Orders    Ambulatory referral to Neurology           Chief Complaint   Patient presents with     Migraine     x 2 this month         Subjective   Diana Pierce is 40 y.o. and presents today for the following health issues     Says she had a rough couple weekends.   She got her period around the 14/ 15th - so at that point she stopped her  ocp.  But did unfortunately have withdrawal from ocp.     Then on 15/16 took Imitrex injection once each day, and it worked.     Was fine for a week.     Thursday started feeling funny, body achy, thought she bad position working, took tylenol, the next day felt ok, but then got neck ache (Friday) - took imitrex and this time it didn't work. It was really bad but she got up and took the imitrex, and by mid afternoon it was really bad.     Sunday she woke up and the whole thing shifted to the left side of her head (had previosuly been on right. It was less intense, more bearable, and then gradually went away.     She took all four shots in one week.     The Zoloft she did try the 100 but reverted to the 50 (only gave it one week, so might try again). She thinks she likes the 50 mg dose. She thought it made her heart race.     She now knows that the neck tension clearly is the first sign of a migraine.     Discussed option of physical therapy to help neck tension which is likely triggering her migraines.     We could also refer to neurology to consider other options and maybe botox.             Objective       Vitals:  No vitals were obtained today due to virtual visit.    Physical Exam   Constitutional: She is oriented to person, place, and time. She appears well-developed and well-nourished.   HENT:   Head: Normocephalic and atraumatic.   Eyes: Conjunctivae and EOM are normal.   Pulmonary/Chest: Effort normal.   Neurological: She is alert and oriented to person, place, and time.   Psychiatric: She has a normal mood and affect. Her behavior is normal. Judgment and thought content normal.           Video-Visit Details    Type of service:  Video Visit    Video End Time (time video stopped): 4:09 PM  Originating Location (pt. Location): Home    Distant Location (provider location):  [facility]     Platform used for Video Visit: [employer]

## 2021-06-20 NOTE — LETTER
Letter by Yris Frazier MD at      Author: Yris Frazier MD Service: -- Author Type: --    Filed:  Encounter Date: 8/5/2020 Status: (Other)       [facility] MEDICINE/OB  [address]  [facility] [address]  [phone]    August 6, 2020      Diana Pierce  [address]  [facility] [address]      Dear Diana,    As your health care provider, I am concerned that your age and/or underlying medical condition puts you at particularly high risk for serious complications should you contract a COVID-19 infection.     Specifically, you have the following conditions/diagnoses, included as high risk conditions per the Centers for Disease Control and Prevention at CDC.gov.     History of recurrent pneumonia.    COVID-19 is a new disease and there is limited information regarding risk factors for severe disease. Based on currently available information and clinical expertise, older adults and people of any age who have serious underlying medical conditions might be at higher risk for severe illness from COVID-19.     It is my medical opinion that you should avoid close contact with others and work from home if possible during this COVID-19 pandemic.     Yris Frazier MD      [facility]

## 2021-06-25 NOTE — TELEPHONE ENCOUNTER
RN cannot approve Refill Request    RN can NOT refill this medication Break in medication. Last office visit: 12/31/2019 Yris Frazier MD Last Physical: 7/30/2019 Last MTM visit: Visit date not found Last visit same specialty: 12/31/2019 Yris Frazier MD.  Next visit within 3 mo: Visit date not found  Next physical within 3 mo: Visit date not found      Erin Lujan, [facility] Triage/Med Refill 5/27/2021    Requested Prescriptions   Pending Prescriptions Disp Refills     sertraline (ZOLOFT) 100 MG tablet [Pharmacy Med Name: Sertraline HCl Oral Tablet 100 MG] 30 tablet 0     Sig: TAKE ONE TABLET BY MOUTH ONE TIME DAILY       [facility] Refill Protocol  Passed - 5/26/2021  1:12 PM        Passed - PCP or prescribing provider visit in last year     Last office visit with prescriber/PCP: 12/31/2019 Yris Frazier MD OR same dept: Visit date not found OR same specialty: 12/31/2019 Yris Frazier MD  Last physical: 7/30/2019 Last MTM visit: Visit date not found   Next visit within 3 mo: Visit date not found  Next physical within 3 mo: Visit date not found  Prescriber OR PCP: Yris Frazier MD  Last diagnosis associated with med order: 1. Migraine without aura and without status migrainosus, not intractable  - sertraline (ZOLOFT) 100 MG tablet [Pharmacy Med Name: Sertraline HCl Oral Tablet 100 MG]; TAKE ONE TABLET BY MOUTH ONE TIME DAILY   Dispense: 30 tablet; Refill: 0    2. Anxiety  - sertraline (ZOLOFT) 100 MG tablet [Pharmacy Med Name: Sertraline HCl Oral Tablet 100 MG]; TAKE ONE TABLET BY MOUTH ONE TIME DAILY   Dispense: 30 tablet; Refill: 0    If protocol passes may refill for 12 months if within 3 months of last provider visit (or a total of 15 months).

## 2021-06-27 NOTE — PROGRESS NOTES
Progress Notes by Yris Frazier MD at 7/30/2019  2:20 PM     Author: Yris Frazier MD Service: -- Author Type: Physician    Filed: 7/30/2019  3:11 PM Encounter Date: 7/30/2019 Status: Signed    : Yris Frazier MD (Physician)       FEMALE PREVENTATIVE EXAM  No pelvic cramping was addressed above and beyond the usual scope of a normal exam.  On ultrasound and gynecology consult was ordered as well as testing for anemia and thyroid disorders.    Assessment and Plan:       Problem List Items Addressed This Visit        High    Tension headache     No longer getting migraines, feels they are more tension headaches now, milder.  Recommended physical therapy, but she declined for now. Will watch and wait per her preference.             Unprioritized    Chronic fatigue     Still present, wants to check routine labs for now, no further intervention requested today, follow up if desired.          Pain In The Hands     Intermittent, stable wishes no further intervention at this time.         Urge and stress incontinence     Never did gyn consult, will refer to gyn again now because she is wanting to look into this.          Relevant Orders    Ambulatory referral to Gynecology    Cutaneous skin tags     Skin tags which are bothersome noted in the following areas right neck( catches on necklace),  bra line (catches on bra strap), waistband line (catches on pant waist band), right groin (nicks when shaving), and left axilla (nicks when shaving)    Recommended excision, will make appt. For skin tag removal procedure.          Pelvic pain in female     Over the last 3 months she is noticed that she has cramping which feel like menstrual cramps intermittently throughout the month despite it not being time for her menses.          Relevant Orders    US Pelvis With Transvaginal Non OB    Ambulatory referral to Gynecology      Other Visit Diagnoses     Encounter for screening mammogram for malignant neoplasm of  breast    -  Primary    Relevant Orders    Mammo Screening Bilateral    Screening for malignant neoplasm of cervix        Relevant Orders    Gynecologic Cytology (PAP Smear)    Screening for metabolic disorder        Relevant Orders    Comprehensive Metabolic Panel    Screening, anemia, deficiency, iron        Relevant Orders    HM1(CBC and Differential)    Screening, lipid        Relevant Orders    Lipid Cascade    Screening for endocrine, nutritional, metabolic and immunity disorder        Relevant Orders    Vitamin D, Total (25-Hydroxy)    Screening for HIV without presence of risk factors        Relevant Orders    HIV Antigen/Antibody Screening [place]            Next follow up:  Return in about 3 months (around 10/30/2019) for flu shot.    Immunization Review  Adult Imm Review: No immunizations due today  Social History     Tobacco Use   Smoking Status Never Smoker   Smokeless Tobacco Never Used        I discussed the following with the patient:   Adult Healthy Living: Importance of regular exercise  Healthy nutrition    I have had an Advance Directives discussion with the patient.    Subjective:   Chief Complaint: Diana Pierce is an 38 y.o. female here for a preventative health visit.     HPI: Comes in for an annual exam saying that she needs a Pap smear and she has been having increased pelvic cramping which is not necessarily related to her..  This is been going on for the past couple months.  She also says that she would like routine Pap smear and other screening tests.    Healthy Habits  Are you taking a daily aspirin? No  Do you typically exercising at least 40 min, 3-4 times per week?  NO  Do you usually eat at least 4 servings of fruit and vegetables a day, include whole grains and fiber and avoid regularly eating high fat foods? NO  Have you had an eye exam in the past two years? NO  Do you see a dentist twice per year? Yes  Do you have any concerns regarding sleep? YES    Safety Screen  If you own  "firearms, are they secured in a locked gun cabinet or with trigger locks? Yes  Do you feel you are safe where you are living?: Yes (7/30/2019  2:21 PM)  Do you feel you are safe in your relationship(s)?: Yes (7/30/2019  2:21 PM)      Review of Systems:  Please see above.  The rest of the review of systems are negative for all systems.     Pap History:   Yes - updated in Problem List and Health Maintenance accordingly  Cancer Screening       Status Date      PAP SMEAR Overdue 7/11/2019      Done 7/11/2014 GYNECOLOGIC CYTOLOGY (PAP SMEAR)          Patient Care Team:  Yris Frazier MD as PCP - General (Family Medicine)        History     Reviewed By Date/Time Sections Reviewed    Yris Frazier MD 7/30/2019  2:30 PM Medical, Surgical, Social Documentation    Lyric Howard MA 7/30/2019  2:20 PM Tobacco            Objective:   Vital Signs:   Visit Vitals  /58 (Patient Site: Left Arm, Patient Position: Sitting, Cuff Size: Adult Regular)   Pulse 83   Ht 5' 4.5\" (1.638 m)   Wt 136 lb (61.7 kg)   LMP 07/08/2019 (Approximate)   SpO2 98%   Breastfeeding? No   BMI 22.98 kg/m           PHYSICAL EXAM  Physical Exam   Constitutional: She is oriented to person, place, and time. She appears well-developed and well-nourished. No distress.   HENT:   Head: Normocephalic and atraumatic.   Eyes: Conjunctivae are normal.   Neck: Neck supple.   Cardiovascular: Normal rate and regular rhythm.   Pulmonary/Chest: Effort normal.   Genitourinary: Pelvic exam was performed with patient supine. No labial fusion. There is no rash, tenderness, lesion or injury on the right labia. There is no rash, tenderness, lesion or injury on the left labia. Uterus is deviated (deviated topatients left) and enlarged (possible slight enlargement). Uterus is not fixed and not tender. Cervix exhibits friability. Cervix exhibits no motion tenderness and no discharge. Right adnexum displays no mass, no tenderness and no fullness. Left adnexum " displays no mass, no tenderness and no fullness. No erythema, tenderness or bleeding in the vagina. No foreign body in the vagina. No signs of injury around the vagina. No vaginal discharge found.   Musculoskeletal: Normal range of motion.   Neurological: She is alert and oriented to person, place, and time.   Skin: Skin is warm and dry.   5 skin tags that catch on necklace, bra strap, dona waist band, and in axilla and leg that do get caught with razor.    Psychiatric: She has a normal mood and affect.        The ASCVD Risk score ([place] MICA Marrero., et al., 2013) failed to calculate for the following reasons:    Cannot find a previous HDL lab    Cannot find a previous total cholesterol lab         Medication List           Accurate as of 7/30/19  3:11 PM. If you have any questions, ask your nurse or doctor.               CONTINUE taking these medications    MULTIPLE VITAMIN ORAL  INSTRUCTIONS:  Take by mouth.               Additional Screenings Completed Today:

## 2021-08-06 NOTE — PROGRESS NOTES
"Assessment:     1. Intercostal muscle strain  naproxen (NAPROSYN) 500 MG tablet          Plan:     I suspect this is largely musculoskeletal, possibly an intercostal muscle strain.  Previous x-ray done at [facility] was negative.  I do not think any further imaging is needed at this time.  Recommend naproxen 500 mg twice daily for the next 2 weeks with food.  Follow-up if not improved after that time.    Subjective:       36 y.o. female presents for evaluation of off and on pain in her left anterior rib cage for 3-4 months.  She has been dealing with a lot of upper respiratory infections including a lot of coughing.  The pain seems to be worse with movement and better with rest.  It is occasionally bothersome to her when she takes a deep breath but not always.  Some days are better than others.  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  While she was on Carlos break and was resting a lot more it seemed to be better.  It then got worse when she got back to the classroom.  She denies any other trauma.  No fevers or chills.  She denies any abdominal pain, nausea, weight loss, bowel changes, shortness of breath, or any other concerning symptoms.    The following portions of the patient's history were reviewed and updated as appropriate: allergies, current medications, past family history, past medical history, past social history, past surgical history and problem list.    Review of Systems  A 12 point comprehensive review of systems was negative except as noted.     Objective:        Visit Vitals     /70     Pulse 88     Resp 16     Ht 5' 5\" (1.651 m)     Wt 125 lb (56.7 kg)     LMP 03/06/2017 (Approximate)     Breastfeeding No     BMI 20.8 kg/m2     General appearance: alert, appears stated age and cooperative  Lungs: clear to auscultation bilaterally  Heart: regular rate and rhythm, S1, S2 normal, no murmur, " click, rub or gallop  Abdomen: soft, non-tender; bowel sounds normal; no masses,  no organomegaly  Chest wall: No rib tenderness in the area of the pain read no intercostal tenderness area of her pain.    This note has been dictated using voice recognition software. Any grammatical or context distortions are unintentional and inherent to the software

## 2021-08-08 ENCOUNTER — HEALTH MAINTENANCE LETTER (OUTPATIENT)
Age: 41
End: 2021-08-08

## 2021-08-17 ENCOUNTER — OFFICE VISIT (OUTPATIENT)
Dept: FAMILY MEDICINE | Facility: CLINIC | Age: 41
End: 2021-08-17
Payer: COMMERCIAL

## 2021-08-17 VITALS
SYSTOLIC BLOOD PRESSURE: 90 MMHG | WEIGHT: 143 LBS | BODY MASS INDEX: 23.82 KG/M2 | DIASTOLIC BLOOD PRESSURE: 68 MMHG | OXYGEN SATURATION: 98 % | HEIGHT: 65 IN | HEART RATE: 86 BPM

## 2021-08-17 DIAGNOSIS — Z00.00 PREVENTATIVE HEALTH CARE: ICD-10-CM

## 2021-08-17 DIAGNOSIS — Z13.0 SCREENING, ANEMIA, DEFICIENCY, IRON: ICD-10-CM

## 2021-08-17 DIAGNOSIS — Z13.220 LIPID SCREENING: ICD-10-CM

## 2021-08-17 DIAGNOSIS — F41.9 ANXIETY: ICD-10-CM

## 2021-08-17 DIAGNOSIS — Z13.228 SCREENING FOR METABOLIC DISORDER: ICD-10-CM

## 2021-08-17 DIAGNOSIS — Z12.31 ENCOUNTER FOR SCREENING MAMMOGRAM FOR BREAST CANCER: ICD-10-CM

## 2021-08-17 DIAGNOSIS — E55.9 VITAMIN D DEFICIENCY: ICD-10-CM

## 2021-08-17 DIAGNOSIS — G43.009 MIGRAINE WITHOUT AURA AND WITHOUT STATUS MIGRAINOSUS, NOT INTRACTABLE: Primary | ICD-10-CM

## 2021-08-17 DIAGNOSIS — N92.6 IRREGULAR MENSTRUAL BLEEDING: ICD-10-CM

## 2021-08-17 LAB
ALBUMIN SERPL-MCNC: 4.1 G/DL (ref 3.5–5)
ALP SERPL-CCNC: 65 U/L (ref 45–120)
ALT SERPL W P-5'-P-CCNC: 12 U/L (ref 0–45)
ANION GAP SERPL CALCULATED.3IONS-SCNC: 9 MMOL/L (ref 5–18)
AST SERPL W P-5'-P-CCNC: 17 U/L (ref 0–40)
BILIRUB SERPL-MCNC: 0.7 MG/DL (ref 0–1)
BUN SERPL-MCNC: 11 MG/DL (ref 8–22)
CALCIUM SERPL-MCNC: 9.9 MG/DL (ref 8.5–10.5)
CHLORIDE BLD-SCNC: 104 MMOL/L (ref 98–107)
CHOLEST SERPL-MCNC: 217 MG/DL
CO2 SERPL-SCNC: 25 MMOL/L (ref 22–31)
CREAT SERPL-MCNC: 0.74 MG/DL (ref 0.6–1.1)
ERYTHROCYTE [DISTWIDTH] IN BLOOD BY AUTOMATED COUNT: 13.1 % (ref 10–15)
FASTING STATUS PATIENT QL REPORTED: YES
GFR SERPL CREATININE-BSD FRML MDRD: >90 ML/MIN/1.73M2
GLUCOSE BLD-MCNC: 88 MG/DL (ref 70–125)
HCT VFR BLD AUTO: 37.8 % (ref 35–47)
HDLC SERPL-MCNC: 69 MG/DL
HGB BLD-MCNC: 12.8 G/DL (ref 11.7–15.7)
LDLC SERPL CALC-MCNC: 111 MG/DL
MCH RBC QN AUTO: 28.6 PG (ref 26.5–33)
MCHC RBC AUTO-ENTMCNC: 33.9 G/DL (ref 31.5–36.5)
MCV RBC AUTO: 85 FL (ref 78–100)
PLATELET # BLD AUTO: 215 10E3/UL (ref 150–450)
POTASSIUM BLD-SCNC: 4.3 MMOL/L (ref 3.5–5)
PROT SERPL-MCNC: 7.2 G/DL (ref 6–8)
RBC # BLD AUTO: 4.47 10E6/UL (ref 3.8–5.2)
SODIUM SERPL-SCNC: 138 MMOL/L (ref 136–145)
TRIGL SERPL-MCNC: 185 MG/DL
WBC # BLD AUTO: 5.2 10E3/UL (ref 4–11)

## 2021-08-17 PROCEDURE — 36415 COLL VENOUS BLD VENIPUNCTURE: CPT | Performed by: FAMILY MEDICINE

## 2021-08-17 PROCEDURE — 99396 PREV VISIT EST AGE 40-64: CPT | Performed by: FAMILY MEDICINE

## 2021-08-17 PROCEDURE — 80053 COMPREHEN METABOLIC PANEL: CPT | Performed by: FAMILY MEDICINE

## 2021-08-17 PROCEDURE — 96127 BRIEF EMOTIONAL/BEHAV ASSMT: CPT | Performed by: FAMILY MEDICINE

## 2021-08-17 PROCEDURE — 82306 VITAMIN D 25 HYDROXY: CPT | Performed by: FAMILY MEDICINE

## 2021-08-17 PROCEDURE — 85027 COMPLETE CBC AUTOMATED: CPT | Performed by: FAMILY MEDICINE

## 2021-08-17 PROCEDURE — 80061 LIPID PANEL: CPT | Performed by: FAMILY MEDICINE

## 2021-08-17 RX ORDER — SERTRALINE HYDROCHLORIDE 100 MG/1
TABLET, FILM COATED ORAL
COMMUNITY
Start: 2021-05-28 | End: 2023-02-06

## 2021-08-17 ASSESSMENT — ENCOUNTER SYMPTOMS
SHORTNESS OF BREATH: 0
ABDOMINAL PAIN: 0
NAUSEA: 0
WEAKNESS: 0
HEADACHES: 0
HEMATOCHEZIA: 0
DIARRHEA: 0
JOINT SWELLING: 0
HEARTBURN: 0
DYSURIA: 0
CONSTIPATION: 0
FREQUENCY: 0
PARESTHESIAS: 0
NERVOUS/ANXIOUS: 0
FEVER: 0
SORE THROAT: 0
CHILLS: 0
EYE PAIN: 0
DIZZINESS: 0
MYALGIAS: 0
PALPITATIONS: 0
HEMATURIA: 0
BREAST MASS: 0
COUGH: 0
ARTHRALGIAS: 0

## 2021-08-17 ASSESSMENT — ANXIETY QUESTIONNAIRES
3. WORRYING TOO MUCH ABOUT DIFFERENT THINGS: SEVERAL DAYS
2. NOT BEING ABLE TO STOP OR CONTROL WORRYING: SEVERAL DAYS
GAD7 TOTAL SCORE: 8
5. BEING SO RESTLESS THAT IT IS HARD TO SIT STILL: NOT AT ALL
4. TROUBLE RELAXING: SEVERAL DAYS
IF YOU CHECKED OFF ANY PROBLEMS ON THIS QUESTIONNAIRE, HOW DIFFICULT HAVE THESE PROBLEMS MADE IT FOR YOU TO DO YOUR WORK, TAKE CARE OF THINGS AT HOME, OR GET ALONG WITH OTHER PEOPLE: SOMEWHAT DIFFICULT
6. BECOMING EASILY ANNOYED OR IRRITABLE: SEVERAL DAYS
7. FEELING AFRAID AS IF SOMETHING AWFUL MIGHT HAPPEN: SEVERAL DAYS
1. FEELING NERVOUS, ANXIOUS, OR ON EDGE: NEARLY EVERY DAY

## 2021-08-17 ASSESSMENT — MIFFLIN-ST. JEOR: SCORE: 1309.52

## 2021-08-17 NOTE — ASSESSMENT & PLAN NOTE
Controlled currently with zoloft 50 mg po q day.   Uses the imitrex injections prn.   Saw neurology. That practice doesn't offer botox, so she might go somewhere else.   She never did physical therapy but it is still available.   She is still not on ocps.   Follow up prn or in 1 year.

## 2021-08-17 NOTE — ASSESSMENT & PLAN NOTE
Covid vaccine done.   Flu shot - recommended in the fall.   Pap: normal without hpv done 7/2019,  so will repeat 7/2022  Mammo: ordered.   Colonoscopy:  There is no family or personal history, not indicated     Std testing desired: declined.  offered  Osteoporosis prevention discussed.  vitamin d levels ordered. Recommend daily calcium and vitamin d intake to keep good bone health. Recommend weight bearing exercise, no tobacco, and limit alcohol  dexa  - no indication.   Recommend sunscreen, exercise, & healthy diet.  Offered cbc, cmp, lipids and asked what other testing she  desires today  I have had an Advance Directives discussion with the patient.   Body mass index is 23.8 kg/m .   mychart active.

## 2021-08-17 NOTE — PROGRESS NOTES
SUBJECTIVE:   CC: Diana Pierce is an 40 year old woman who presents for preventive health visit.       Patient has been advised of split billing requirements and indicates understanding: Yes  Healthy Habits:     Getting at least 3 servings of Calcium per day:  NO    Bi-annual eye exam:  NO    Dental care twice a year:  Yes    Sleep apnea or symptoms of sleep apnea:  None    Diet:  Regular (no restrictions)    Frequency of exercise:  2-3 days/week    Duration of exercise:  15-30 minutes    Taking medications regularly:  Yes    PHQ-2 Total Score: 0    Additional concerns today:  Yes      Today's PHQ-2 Score:   PHQ-2 ( 1999 Pfizer) 8/16/2021   Q1: Little interest or pleasure in doing things 0   Q2: Feeling down, depressed or hopeless 0   PHQ-2 Score 0   Q1: Little interest or pleasure in doing things Not at all   Q2: Feeling down, depressed or hopeless Not at all   PHQ-2 Score 0       Abuse: Current or Past (Physical, Sexual or Emotional) - No  Do you feel safe in your environment? Yes    Social History     Tobacco Use     Smoking status: Never Smoker     Smokeless tobacco: Never Used   Substance Use Topics     Alcohol use: Yes     Alcohol/week: 0.0 - 1.0 standard drinks     If you drink alcohol do you typically have >3 drinks per day or >7 drinks per week? No    Alcohol Use 8/17/2021   Prescreen: >3 drinks/day or >7 drinks/week? -   Prescreen: >3 drinks/day or >7 drinks/week? No       Reviewed orders with patient.  Reviewed health maintenance and updated orders accordingly - Yes  Lab work is in process  Labs reviewed in EPIC    Breast Cancer Screening:  Any new diagnosis of family breast, ovarian, or bowel cancer? No    FHS-7: No flowsheet data found.  click delete button to remove this line now  Mammogram Screening - Offered annual screening and updated Health Maintenance for mutual plan based on risk factor consideration    Pertinent mammograms are reviewed under the imaging tab.    History of abnormal Pap  smear: NO - age 30- 65 PAP every 3 years recommended  PAP / HPV Latest Ref Rng & Units 7/30/2019 7/11/2014   PAP Negative for squamous intraepithelial lesion or malignancy. Negative for squamous intraepithelial lesion or malignancy  Electronically signed by Bill Rodríguez CT (ASCP) on 7/31/2019 at  3:00 PM   Negative for squamous intraepithelial lesion or malignancy  Electronically signed by Gardenia Adam CT (ASCP) on 7/23/2014 at 12:04 PM       Reviewed and updated as needed this visit by clinical staff  Tobacco  Allergies  Meds  Problems  Med Hx  Surg Hx  Fam Hx  Soc Hx          Reviewed and updated as needed this visit by Provider  Tobacco  Allergies  Meds  Problems  Med Hx  Surg Hx  Fam Hx  Soc Hx         Past Medical History:   Diagnosis Date     Acute maxillary sinusitis      Acute upper respiratory infections of unspecified site      Generalized abdominal pain 7/30/2019     H/O dilation and curettage      Hemangioma of unspecified site      Hyponatremia 1/2/2020     Migraine without aura, without mention of intractable migraine without mention of status migrainosus      Other malaise and fatigue      Pain in limb      Pneumonia     recurrent in childhood     Wheezing       Past Surgical History:   Procedure Laterality Date     ENT SURGERY  2004     HC DILATION/CURETTAGE DIAG/THER NON OB      Description: Dilation And Curettage;  Recorded: 07/11/2014;     NASAL SEPTUM SURGERY         Review of Systems   Constitutional: Negative for chills and fever.   HENT: Negative for congestion, ear pain, hearing loss and sore throat.    Eyes: Negative for pain and visual disturbance.   Respiratory: Negative for cough and shortness of breath.    Cardiovascular: Negative for chest pain, palpitations and peripheral edema.   Gastrointestinal: Negative for abdominal pain, constipation, diarrhea, heartburn, hematochezia and nausea.   Breasts:  Negative for tenderness, breast mass and discharge.  "  Genitourinary: Negative for dysuria, frequency, genital sores, hematuria, pelvic pain, urgency, vaginal bleeding and vaginal discharge.   Musculoskeletal: Negative for arthralgias, joint swelling and myalgias.   Skin: Negative for rash.   Neurological: Negative for dizziness, weakness, headaches and paresthesias.   Psychiatric/Behavioral: Negative for mood changes. The patient is not nervous/anxious.           OBJECTIVE:   BP 90/68 (BP Location: Left arm, Patient Position: Left side, Cuff Size: Adult Regular)   Pulse 86   Ht 1.651 m (5' 5\")   Wt 64.9 kg (143 lb)   LMP 08/10/2021 (Approximate)   SpO2 98%   BMI 23.80 kg/m    Physical Exam  Constitutional:       Appearance: Normal appearance.   HENT:      Head: Normocephalic and atraumatic.      Right Ear: Tympanic membrane, ear canal and external ear normal.      Left Ear: Tympanic membrane, ear canal and external ear normal.      Nose: Nose normal.      Mouth/Throat:      Mouth: Mucous membranes are moist.      Pharynx: Oropharynx is clear.   Eyes:      Extraocular Movements: Extraocular movements intact.      Conjunctiva/sclera: Conjunctivae normal.      Pupils: Pupils are equal, round, and reactive to light.   Cardiovascular:      Rate and Rhythm: Normal rate and regular rhythm.      Heart sounds: Normal heart sounds.   Pulmonary:      Effort: Pulmonary effort is normal.      Breath sounds: Normal breath sounds.   Chest:      Breasts: Breasts are symmetrical.         Right: Normal. No swelling, bleeding, inverted nipple, mass, nipple discharge, skin change or tenderness.         Left: Normal. No swelling, bleeding, inverted nipple, mass, nipple discharge, skin change or tenderness.   Abdominal:      General: Bowel sounds are normal.      Palpations: Abdomen is soft.   Musculoskeletal:         General: Normal range of motion.      Cervical back: Normal range of motion and neck supple.   Skin:     General: Skin is warm and dry.      Capillary Refill: " Capillary refill takes less than 2 seconds.   Neurological:      General: No focal deficit present.      Mental Status: She is alert and oriented to person, place, and time.   Psychiatric:         Mood and Affect: Mood normal.         Behavior: Behavior normal.         Thought Content: Thought content normal.         Judgment: Judgment normal.         ASSESSMENT/PLAN:     Problem List Items Addressed This Visit        Nervous and Auditory    Migraine without aura and without status migrainosus, not intractable - Primary     Controlled currently with zoloft 50 mg po q day.   Uses the imitrex injections prn.   Saw neurology. That practice doesn't offer botox, so she might go somewhere else.   She never did physical therapy but it is still available.   She is still not on ocps.   Follow up prn or in 1 year.          Relevant Medications    sertraline (ZOLOFT) 100 MG tablet    sertraline (ZOLOFT) 50 MG tablet    SUMAtriptan (IMITREX STATDOSE) 6 MG/0.5ML refill cartridge       Digestive    Vitamin D deficiency    Relevant Medications    vitamin D3 (CHOLECALCIFEROL) 1.25 MG (46992 UT) capsule    Other Relevant Orders    Vitamin D Deficiency       Urinary    Irregular menstrual bleeding     Doing a follow up with gyn and considering ablation.            Other    Preventative health care     Covid vaccine done.   Flu shot - recommended in the fall.   Pap: normal without hpv done 7/2019,  so will repeat 7/2022  Mammo: ordered.   Colonoscopy:  There is no family or personal history, not indicated     Std testing desired: declined.  offered  Osteoporosis prevention discussed.  vitamin d levels ordered. Recommend daily calcium and vitamin d intake to keep good bone health. Recommend weight bearing exercise, no tobacco, and limit alcohol  dexa  - no indication.   Recommend sunscreen, exercise, & healthy diet.  Offered cbc, cmp, lipids and asked what other testing she  desires today  I have had an Advance Directives discussion  "with the patient.   Body mass index is 23.8 kg/m .   mychart active.         Anxiety    Relevant Medications    sertraline (ZOLOFT) 100 MG tablet    sertraline (ZOLOFT) 50 MG tablet      Other Visit Diagnoses     Encounter for screening mammogram for breast cancer        Relevant Orders    *MA Screening Digital Bilateral    Lipid screening        Relevant Orders    Lipid Profile    Screening for metabolic disorder        Relevant Orders    Comprehensive metabolic panel (BMP + Alb, Alk Phos, ALT, AST, Total. Bili, TP)    Screening, anemia, deficiency, iron        Relevant Orders    CBC with platelets          Patient has been advised of split billing requirements and indicates understanding: Yes  COUNSELING:  Reviewed preventive health counseling, as reflected in patient instructions    Estimated body mass index is 23.8 kg/m  as calculated from the following:    Height as of this encounter: 1.651 m (5' 5\").    Weight as of this encounter: 64.9 kg (143 lb).        She reports that she has never smoked. She has never used smokeless tobacco.      Yris Frazier MD  [facility]  "

## 2021-08-18 ENCOUNTER — MYC MEDICAL ADVICE (OUTPATIENT)
Dept: FAMILY MEDICINE | Facility: CLINIC | Age: 41
End: 2021-08-18

## 2021-08-18 DIAGNOSIS — E78.2 MIXED HYPERLIPIDEMIA: Primary | ICD-10-CM

## 2021-08-18 LAB — DEPRECATED CALCIDIOL+CALCIFEROL SERPL-MC: 32 UG/L (ref 30–80)

## 2021-08-19 ENCOUNTER — ANCILLARY PROCEDURE (OUTPATIENT)
Dept: MAMMOGRAPHY | Facility: CLINIC | Age: 41
End: 2021-08-19
Attending: FAMILY MEDICINE
Payer: COMMERCIAL

## 2021-08-19 DIAGNOSIS — Z12.31 ENCOUNTER FOR SCREENING MAMMOGRAM FOR BREAST CANCER: ICD-10-CM

## 2021-08-19 PROCEDURE — 77063 BREAST TOMOSYNTHESIS BI: CPT

## 2021-10-04 ENCOUNTER — HEALTH MAINTENANCE LETTER (OUTPATIENT)
Age: 41
End: 2021-10-04

## 2022-09-11 ENCOUNTER — HEALTH MAINTENANCE LETTER (OUTPATIENT)
Age: 42
End: 2022-09-11

## 2022-10-26 ENCOUNTER — TRANSFERRED RECORDS (OUTPATIENT)
Dept: MULTI SPECIALTY CLINIC | Facility: CLINIC | Age: 42
End: 2022-10-26

## 2022-10-26 LAB
HPV ABSTRACT: NORMAL
PAP-ABSTRACT: NORMAL

## 2022-11-23 ENCOUNTER — ANCILLARY PROCEDURE (OUTPATIENT)
Dept: MAMMOGRAPHY | Facility: CLINIC | Age: 42
End: 2022-11-23
Attending: FAMILY MEDICINE
Payer: COMMERCIAL

## 2022-11-23 DIAGNOSIS — Z12.31 VISIT FOR SCREENING MAMMOGRAM: ICD-10-CM

## 2022-11-23 PROCEDURE — 77067 SCR MAMMO BI INCL CAD: CPT

## 2023-01-22 ENCOUNTER — HEALTH MAINTENANCE LETTER (OUTPATIENT)
Age: 43
End: 2023-01-22

## 2023-02-06 ENCOUNTER — OFFICE VISIT (OUTPATIENT)
Dept: FAMILY MEDICINE | Facility: CLINIC | Age: 43
End: 2023-02-06
Payer: COMMERCIAL

## 2023-02-06 VITALS
HEIGHT: 65 IN | DIASTOLIC BLOOD PRESSURE: 60 MMHG | BODY MASS INDEX: 23.11 KG/M2 | WEIGHT: 138.7 LBS | RESPIRATION RATE: 14 BRPM | SYSTOLIC BLOOD PRESSURE: 106 MMHG | OXYGEN SATURATION: 100 % | HEART RATE: 100 BPM

## 2023-02-06 DIAGNOSIS — N63.11 BREAST LUMP ON RIGHT SIDE AT 10 O'CLOCK POSITION: Primary | ICD-10-CM

## 2023-02-06 PROCEDURE — 99213 OFFICE O/P EST LOW 20 MIN: CPT

## 2023-02-06 RX ORDER — IBUPROFEN 800 MG/1
TABLET, FILM COATED ORAL
COMMUNITY
Start: 2022-11-30 | End: 2023-06-30

## 2023-02-06 RX ORDER — FLUTICASONE PROPIONATE 50 MCG
SPRAY, SUSPENSION (ML) NASAL
COMMUNITY
Start: 2022-09-24

## 2023-02-06 ASSESSMENT — ENCOUNTER SYMPTOMS: BREAST MASS: 1

## 2023-02-06 NOTE — PROGRESS NOTES
Problem List Items Addressed This Visit    None  Visit Diagnoses     Breast lump on right side at 10 o'clock position    -  Primary    Relevant Orders    US Breast Right Limited 1-3 Quadrants    MA Diagnostic Digital Right         Provided reassurance that patient had a negative mammogram just three months ago, which did show dense breast tissue and some benign appearing masses. Additionally, discussed her confounding variables of her recent hysterectomy and hormonal changes related to that, as patient seems to be sensitive to her cycle as it pertains to breast changes per her report. Will place an order for diagnostic ultrasound/mammography on that side for further reassurance. Patient will call to schedule.    BASHIR Duckworth CNP on 2/6/2023 at 7:58 AM      Griffin Mitchell is a 42 year old who presents for the following health issues     Chief Complaint   Patient presents with     Breast Problem     Feel lump on in both breast, more on the right breast than left.  No pain but a little tender.  Feel it about two wks ago, had normal mammogram in November 2022.       Patient reports that she usually notices breast changes with her cycles.She had a hysterectomy at the end of November, so she is unsure how her cycle is currently, but does report that she has ovaries and feels sensitive to things such as ovulation so initially she believed the changes were related to this. Since then, the left breast has normalized, but she has noticed two areas in the right breast that do not feel 'normal' to her. She denies any breast pain; some minimal tenderness that she attributes to repeated palpation of the area. No nipple discharge or skin changes. Had a recent mammogram at the end of November, 2022, which showed dense breast tissue but no concerning findings      History of Present Illness       Reason for visit:  Lumps in right breast  Symptom onset:  1-2 weeks ago  Had these symptoms before:  Yes    She eats 2-3  "servings of fruits and vegetables daily.She consumes 1 sweetened beverage(s) daily.She exercises with enough effort to increase her heart rate 10 to 19 minutes per day.  She exercises with enough effort to increase her heart rate 3 or less days per week.   She is taking medications regularly.       Review of Systems   Breasts:  Positive for breast mass (right). Negative for tenderness and discharge.        Denies any nipple discharge, skin dimpling or changes            Objective    /60 (BP Location: Left arm, Patient Position: Sitting, Cuff Size: Adult Regular)   Pulse 100   Resp 14   Ht 1.651 m (5' 5\")   Wt 62.9 kg (138 lb 11.2 oz)   LMP 08/10/2021 (Approximate)   SpO2 100%   BMI 23.08 kg/m    Body mass index is 23.08 kg/m .  Physical Exam  Vitals and nursing note reviewed.   Constitutional:       General: She is not in acute distress.     Appearance: Normal appearance. She is normal weight. She is not ill-appearing.   Chest:          Comments: Left breast: Dense tissue appreciated, specifically on the lateral aspect of left breast.   Right breast: Dense tissue appreciated; small and soft mobile lump palpated along border or areola at the 10:00 position. Nontender  Bilateral: No skin dimpling or changes appreciated with various positioning. No nipple discharge expressed with light palpation.    Neurological:      Mental Status: She is alert.          This note has been dictated using voice recognition software. Any grammatical or context distortions are unintentional and inherent to the software      "

## 2023-02-21 ENCOUNTER — HOSPITAL ENCOUNTER (OUTPATIENT)
Dept: MAMMOGRAPHY | Facility: CLINIC | Age: 43
Discharge: HOME | End: 2023-02-21
Payer: COMMERCIAL

## 2023-02-21 DIAGNOSIS — N63.11 BREAST LUMP ON RIGHT SIDE AT 10 O'CLOCK POSITION: ICD-10-CM

## 2023-02-21 PROCEDURE — 76642 ULTRASOUND BREAST LIMITED: CPT | Mod: RT

## 2023-06-12 ENCOUNTER — MYC MEDICAL ADVICE (OUTPATIENT)
Dept: FAMILY MEDICINE | Facility: CLINIC | Age: 43
End: 2023-06-12
Payer: COMMERCIAL

## 2023-06-13 NOTE — TELEPHONE ENCOUNTER
Left message to call back for: patient x1  Information to relay to patient: Schedule OV with  for these concerns.  Same-day/ reserved ok

## 2023-06-30 ENCOUNTER — OFFICE VISIT (OUTPATIENT)
Dept: FAMILY MEDICINE | Facility: CLINIC | Age: 43
End: 2023-06-30
Payer: COMMERCIAL

## 2023-06-30 VITALS
HEIGHT: 65 IN | OXYGEN SATURATION: 99 % | SYSTOLIC BLOOD PRESSURE: 118 MMHG | RESPIRATION RATE: 16 BRPM | BODY MASS INDEX: 22.73 KG/M2 | HEART RATE: 84 BPM | WEIGHT: 136.4 LBS | DIASTOLIC BLOOD PRESSURE: 80 MMHG

## 2023-06-30 DIAGNOSIS — F41.9 ANXIETY: ICD-10-CM

## 2023-06-30 DIAGNOSIS — G43.009 MIGRAINE WITHOUT AURA AND WITHOUT STATUS MIGRAINOSUS, NOT INTRACTABLE: ICD-10-CM

## 2023-06-30 DIAGNOSIS — E55.9 VITAMIN D DEFICIENCY: ICD-10-CM

## 2023-06-30 DIAGNOSIS — Z00.00 PREVENTATIVE HEALTH CARE: ICD-10-CM

## 2023-06-30 DIAGNOSIS — Z11.59 NEED FOR HEPATITIS C SCREENING TEST: Primary | ICD-10-CM

## 2023-06-30 PROCEDURE — 99214 OFFICE O/P EST MOD 30 MIN: CPT | Performed by: FAMILY MEDICINE

## 2023-06-30 RX ORDER — SUMATRIPTAN 50 MG/1
50 TABLET, FILM COATED ORAL
Qty: 9 TABLET | Refills: 1 | Status: SHIPPED | OUTPATIENT
Start: 2023-06-30 | End: 2023-11-24

## 2023-06-30 ASSESSMENT — ANXIETY QUESTIONNAIRES
8. IF YOU CHECKED OFF ANY PROBLEMS, HOW DIFFICULT HAVE THESE MADE IT FOR YOU TO DO YOUR WORK, TAKE CARE OF THINGS AT HOME, OR GET ALONG WITH OTHER PEOPLE?: SOMEWHAT DIFFICULT
7. FEELING AFRAID AS IF SOMETHING AWFUL MIGHT HAPPEN: NOT AT ALL
3. WORRYING TOO MUCH ABOUT DIFFERENT THINGS: MORE THAN HALF THE DAYS
GAD7 TOTAL SCORE: 8
1. FEELING NERVOUS, ANXIOUS, OR ON EDGE: MORE THAN HALF THE DAYS
IF YOU CHECKED OFF ANY PROBLEMS ON THIS QUESTIONNAIRE, HOW DIFFICULT HAVE THESE PROBLEMS MADE IT FOR YOU TO DO YOUR WORK, TAKE CARE OF THINGS AT HOME, OR GET ALONG WITH OTHER PEOPLE: SOMEWHAT DIFFICULT
GAD7 TOTAL SCORE: 8
5. BEING SO RESTLESS THAT IT IS HARD TO SIT STILL: NOT AT ALL
7. FEELING AFRAID AS IF SOMETHING AWFUL MIGHT HAPPEN: NOT AT ALL
6. BECOMING EASILY ANNOYED OR IRRITABLE: SEVERAL DAYS
4. TROUBLE RELAXING: SEVERAL DAYS
GAD7 TOTAL SCORE: 8
2. NOT BEING ABLE TO STOP OR CONTROL WORRYING: MORE THAN HALF THE DAYS

## 2023-06-30 ASSESSMENT — PATIENT HEALTH QUESTIONNAIRE - PHQ9
SUM OF ALL RESPONSES TO PHQ QUESTIONS 1-9: 4
SUM OF ALL RESPONSES TO PHQ QUESTIONS 1-9: 4
10. IF YOU CHECKED OFF ANY PROBLEMS, HOW DIFFICULT HAVE THESE PROBLEMS MADE IT FOR YOU TO DO YOUR WORK, TAKE CARE OF THINGS AT HOME, OR GET ALONG WITH OTHER PEOPLE: SOMEWHAT DIFFICULT

## 2023-06-30 NOTE — PROGRESS NOTES
Problem List Items Addressed This Visit        Nervous and Auditory    Migraine without aura and without status migrainosus, not intractable     Migraine -   Recently took friends 50 mg imitrex and she said this worked and so she prefers oral pill as she is feeling averse to self injection.    Discontinue injectable imitrex  Start oral imitrex 50 mg po q day.  She gets these once a month. Plan follow up on or before 6/30/2024         Relevant Medications    SUMAtriptan (IMITREX) 50 MG tablet    sertraline (ZOLOFT) 50 MG tablet       Digestive    Vitamin D deficiency    Relevant Medications    vitamin D3 (CHOLECALCIFEROL) 1.25 MG (86630 UT) capsule       Other    Preventative health care     Chart reviewed: 8/31/2021 nl mammo 11/23/22 nl mammo 2/21/23 benign mammo         Anxiety     Anxiety - in the past she took zoloft 50 mg po q day and it worked and she want to restart that and she already has a counselor.          Relevant Medications    sertraline (ZOLOFT) 50 MG tablet   Other Visit Diagnoses     Need for hepatitis C screening test    -  Primary           Subjective   Paula is a 42 year old, presenting for the following health issues:  Follow Up (Anxiety, would like to discuss migraine treatment)        6/30/2023     2:09 PM   Additional Questions   Roomed by Oleg Ruiz MA   Accompanied by Self         6/30/2023     2:09 PM   Patient Reported Additional Medications   Patient reports taking the following new medications None     History of Present Illness       Mental Health Follow-up:  Patient presents to follow-up on Anxiety.    Patient's anxiety since last visit has been:  Medium  The patient is having other symptoms associated with anxiety.  Any significant life events: No  Patient is feeling anxious or having panic attacks.  Patient has no concerns about alcohol or drug use.    Headaches:   Since the patient's last clinic visit, headaches are: no change  The patient is getting headaches:  Monthly  She is  "not able to do normal daily activities when she has a migraine.  The patient is taking the following rescue/relief medications:  Naproxyn (Aleve) and Excedrin   Patient states \"I get no relief\" from the rescue/relief medications.   The patient is taking the following medications to prevent migraines:  No medications to prevent migraines  In the past 4 weeks, the patient has gone to an Urgent Care or Emergency Room 0 times times due to headaches.    She eats 2-3 servings of fruits and vegetables daily.She consumes 1 sweetened beverage(s) daily.She exercises with enough effort to increase her heart rate 20 to 29 minutes per day.  She exercises with enough effort to increase her heart rate 3 or less days per week.   She is taking medications regularly.    Today's PHQ-9         PHQ-9 Total Score: 4    PHQ-9 Q9 Thoughts of better off dead/self-harm past 2 weeks :   Not at all    How difficult have these problems made it for you to do your work, take care of things at home, or get along with other people: Somewhat difficult  Today's KARLY-7 Score: 8             Objective    /80 (BP Location: Left arm, Patient Position: Sitting, Cuff Size: Adult Regular)   Pulse 84   Resp 16   Ht 1.651 m (5' 5\")   Wt 61.9 kg (136 lb 6.4 oz)   LMP 08/10/2021 (Approximate)   SpO2 99%   BMI 22.70 kg/m    Body mass index is 22.7 kg/m .  Physical Exam  Constitutional:       Appearance: Normal appearance.   HENT:      Head: Normocephalic and atraumatic.   Cardiovascular:      Rate and Rhythm: Normal rate and regular rhythm.   Pulmonary:      Effort: Pulmonary effort is normal.   Musculoskeletal:         General: Normal range of motion.      Cervical back: Normal range of motion and neck supple.   Neurological:      General: No focal deficit present.      Mental Status: She is alert and oriented to person, place, and time.            "

## 2023-06-30 NOTE — ASSESSMENT & PLAN NOTE
Migraine -   Recently took friends 50 mg imitrex and she said this worked and so she prefers oral pill as she is feeling averse to self injection.    Discontinue injectable imitrex  Start oral imitrex 50 mg po q day.  She gets these once a month. Plan follow up on or before 6/30/2024

## 2023-06-30 NOTE — ASSESSMENT & PLAN NOTE
Anxiety - in the past she took zoloft 50 mg po q day and it worked and she want to restart that and she already has a counselor.

## 2023-10-24 ENCOUNTER — PATIENT OUTREACH (OUTPATIENT)
Dept: CARE COORDINATION | Facility: CLINIC | Age: 43
End: 2023-10-24
Payer: COMMERCIAL

## 2023-11-17 ENCOUNTER — NURSE TRIAGE (OUTPATIENT)
Dept: FAMILY MEDICINE | Facility: CLINIC | Age: 43
End: 2023-11-17
Payer: COMMERCIAL

## 2023-11-17 ENCOUNTER — MYC MEDICAL ADVICE (OUTPATIENT)
Dept: FAMILY MEDICINE | Facility: CLINIC | Age: 43
End: 2023-11-17
Payer: COMMERCIAL

## 2023-11-17 DIAGNOSIS — U07.1 INFECTION DUE TO 2019 NOVEL CORONAVIRUS: Primary | ICD-10-CM

## 2023-11-17 NOTE — TELEPHONE ENCOUNTER
Reason for Disposition    HIGH RISK patient (e.g., weak immune system, age > 64 years, obesity with BMI of 30 or higher, pregnant, chronic lung disease or other chronic medical condition) and COVID symptoms (e.g., cough, fever)  (Exceptions: Already seen by doctor or NP/PA and no new or worsening symptoms.)    Additional Information    Negative: SEVERE difficulty breathing (e.g., struggling for each breath, speaks in single words)    Negative: Difficult to awaken or acting confused (e.g., disoriented, slurred speech)    Negative: Bluish (or gray) lips or face now    Negative: Shock suspected (e.g., cold/pale/clammy skin, too weak to stand, low BP, rapid pulse)    Negative: Sounds like a life-threatening emergency to the triager    Negative: SEVERE or constant chest pain or pressure  (Exception: Mild central chest pain, present only when coughing.)    Negative: MODERATE difficulty breathing (e.g., speaks in phrases, SOB even at rest, pulse 100-120)    Negative: Headache and stiff neck (can't touch chin to chest)    Negative: Chest pain or pressure  (Exception: MILD central chest pain, present only when coughing.)    Negative: Drinking very little and dehydration suspected (e.g., no urine > 12 hours, very dry mouth, very lightheaded)    Negative: Patient sounds very sick or weak to the triager    Negative: MILD difficulty breathing (e.g., minimal/no SOB at rest, SOB with walking, pulse <100)    Negative: Fever > 103 F (39.4 C)    Negative: Fever > 101 F (38.3 C) and over 60 years of age    Negative: Fever > 100.0 F (37.8 C) and bedridden (e.g., CVA, chronic illness, recovering from surgery)    Protocols used: Coronavirus (COVID-19) Diagnosed or [id number]

## 2023-11-17 NOTE — CONFIDENTIAL NOTE
RN COVID TREATMENT VISIT  11/17/23    The patient has been triaged and does not require a higher level of care.    Diana Pierce  43 year old  Current weight? 136 lb    Has the patient been seen by a primary care provider at an [facility] or [facility] within the past two years? Yes.   Have you been in close proximity to/do you have a known exposure to a person with a confirmed case of influenza? No.     General treatment eligibility:  Date of positive COVID test (PCR or at home)?  11/17/23    Are you or have you been hospitalized for this COVID-19 infection? No.   Have you received monoclonal antibodies or antiviral treatment for COVID-19 since this positive test? No.   Do you have any of the following conditions that place you at risk of being very sick from COVID-19?   - Mental health disorders including mood disorders, depression, schizophrenia spectrum disorders   Yes, patient has at least one high risk condition as noted above.     Current COVID symptoms:   - fever or chills  - cough  - fatigue  - muscle or body aches  - headache  Yes. Patient has at least one symptom as selected.     How many days since symptoms started? 5 days or less. Established patient, 12 years or older weighing at least 88.2 lbs, who has symptoms that started in the past 5 days, has not been hospitalized nor received treatment already, and is at risk for being very sick from COVID-19.     Treatment eligibility by RN:  Are you currently pregnant or nursing? No  Do you have a clinically significant hypersensitivity to nirmatrelvir or ritonavir, or toxic epidermal necrolysis (TEN) or Kearns-Lee Syndrome? No  Do you have a history of hepatitis, any hepatic impairment on the Problem List (such as Child-Romo Class C, cirrhosis, fatty liver disease, alcoholic liver disease), or was the last liver lab (hepatic panel, ALT, AST, ALK Phos, bilirubin) elevated in the past 6 months? No  Do you have any history of  severe renal impairment (eGFR < 30mL/min)? No    Is patient eligible to continue? Yes, patient meets all eligibility requirements for the RN COVID treatment (as denoted by all no responses above).     Current Outpatient Medications   Medication Sig Dispense Refill    fluticasone (FLONASE) 50 MCG/ACT nasal spray Spray 1-2 sprays into both nostrils once a day as directed      multivitamin w/minerals (THERA-VIT-M) tablet Take 1 tablet by mouth daily      sertraline (ZOLOFT) 50 MG tablet Take 1 tablet (50 mg) by mouth daily 90 tablet 0    SUMAtriptan (IMITREX) 50 MG tablet Take 1 tablet (50 mg) by mouth at onset of headache for migraine May repeat in 2 hours. Max 4 tablets/24 hours. 9 tablet 1    vitamin D3 (CHOLECALCIFEROL) 1.25 MG (13225 UT) capsule Take 1 capsule (50,000 Units) by mouth every 7 days 12 capsule 3       Medications from List 1 of the standing order (on medications that exclude the use of Paxlovid) that patient is taking: NONE. Is patient taking Shonda's Wort? No  Is patient taking [place]'s Wort or any meds from List 1? No.   Medications from List 2 of the standing order (on meds that provider needs to adjust) that patient is taking: NONE. Is patient on any of the meds from List 2? No.   Medications from List 3 of standing order (on meds that a RN needs to adjust) that patient is taking: NONE. Is patient on any meds from List 3? No.     Paxlovid has an approximate 90% reduction in hospitalization. Paxlovid can possibly cause altered sense of taste, diarrhea (loose, watery stools), high blood pressure, muscle aches.     Would patient like a Paxlovid prescription?   Yes.   Lab Results   Component Value Date    GFRESTIMATED >90 08/17/2021       Was last eGFR reduced? No, eGFR 60 or greater/ No Result on record. Patient can receive the normal renal function dose. Paxlovid Rx sent to [place] [facility]   Cub    Temporary change to home medications: None    All medication adjustments (holds, etc) were  discussed with the patient and patient was asked to repeat back (teachback) their med adjustment.  Did patient understand med adjustment? No medication adjustments needed.         Reviewed the following instructions with the patient:    Paxlovid (nimatrelvir and ritonavir)    How it works  Two medicines (nirmatrelvir and ritonavir) are taken together. They stop the virus from growing. Less amount of virus is easier for your body to fight.    How to take  Medicine comes in a daily container with both medicine tablets. Take by mouth twice daily (once in the morning, once at night) for 5 days.  The number of tablets to take varies by patient.  Don't chew or break capsules. Swallow whole.    When to take  Take as soon as possible after positive COVID-19 test result, and within 5 days of your first symptoms.    Possible side effects  Can cause altered sense of taste, diarrhea (loose, watery stools), high blood pressure, muscle aches.    Teagan Albright RN

## 2023-11-21 ENCOUNTER — PATIENT OUTREACH (OUTPATIENT)
Dept: CARE COORDINATION | Facility: CLINIC | Age: 43
End: 2023-11-21
Payer: COMMERCIAL

## 2023-11-23 ASSESSMENT — ENCOUNTER SYMPTOMS
ARTHRALGIAS: 0
DYSURIA: 0
SHORTNESS OF BREATH: 0
HEMATOCHEZIA: 0
JOINT SWELLING: 0
EYE PAIN: 0
HEARTBURN: 0
DIZZINESS: 0
NAUSEA: 0
ABDOMINAL PAIN: 0
CHILLS: 0
CONSTIPATION: 0
FEVER: 0
FREQUENCY: 0
HEMATURIA: 0
HEADACHES: 0
WEAKNESS: 0
COUGH: 0
NERVOUS/ANXIOUS: 0
PARESTHESIAS: 0
DIARRHEA: 0
PALPITATIONS: 0
BREAST MASS: 0
SORE THROAT: 0
MYALGIAS: 0

## 2023-11-24 ENCOUNTER — OFFICE VISIT (OUTPATIENT)
Dept: FAMILY MEDICINE | Facility: CLINIC | Age: 43
End: 2023-11-24
Payer: COMMERCIAL

## 2023-11-24 VITALS
SYSTOLIC BLOOD PRESSURE: 112 MMHG | HEIGHT: 65 IN | HEART RATE: 91 BPM | BODY MASS INDEX: 21.39 KG/M2 | OXYGEN SATURATION: 99 % | DIASTOLIC BLOOD PRESSURE: 75 MMHG | WEIGHT: 128.4 LBS | TEMPERATURE: 97.9 F | RESPIRATION RATE: 16 BRPM

## 2023-11-24 DIAGNOSIS — Z00.00 ROUTINE GENERAL MEDICAL EXAMINATION AT A HEALTH CARE FACILITY: Primary | ICD-10-CM

## 2023-11-24 DIAGNOSIS — N92.6 IRREGULAR MENSTRUAL BLEEDING: ICD-10-CM

## 2023-11-24 DIAGNOSIS — Z13.220 SCREENING FOR LIPID DISORDERS: ICD-10-CM

## 2023-11-24 DIAGNOSIS — F41.9 ANXIETY: ICD-10-CM

## 2023-11-24 DIAGNOSIS — Z12.31 VISIT FOR SCREENING MAMMOGRAM: ICD-10-CM

## 2023-11-24 DIAGNOSIS — E55.9 VITAMIN D DEFICIENCY: ICD-10-CM

## 2023-11-24 DIAGNOSIS — Z12.11 SCREEN FOR COLON CANCER: ICD-10-CM

## 2023-11-24 DIAGNOSIS — G43.009 MIGRAINE WITHOUT AURA AND WITHOUT STATUS MIGRAINOSUS, NOT INTRACTABLE: ICD-10-CM

## 2023-11-24 LAB
CHOLEST SERPL-MCNC: 233 MG/DL
HDLC SERPL-MCNC: 51 MG/DL
LDLC SERPL CALC-MCNC: 137 MG/DL
NONHDLC SERPL-MCNC: 182 MG/DL
TRIGL SERPL-MCNC: 224 MG/DL
VIT D+METAB SERPL-MCNC: 33 NG/ML (ref 20–50)

## 2023-11-24 PROCEDURE — 90480 ADMN SARSCOV2 VAC 1/ONLY CMP: CPT

## 2023-11-24 PROCEDURE — 99213 OFFICE O/P EST LOW 20 MIN: CPT | Mod: 25

## 2023-11-24 PROCEDURE — 82306 VITAMIN D 25 HYDROXY: CPT

## 2023-11-24 PROCEDURE — 91320 SARSCV2 VAC 30MCG TRS-SUC IM: CPT

## 2023-11-24 PROCEDURE — 36415 COLL VENOUS BLD VENIPUNCTURE: CPT

## 2023-11-24 PROCEDURE — 99396 PREV VISIT EST AGE 40-64: CPT | Mod: 25

## 2023-11-24 PROCEDURE — 80061 LIPID PANEL: CPT

## 2023-11-24 RX ORDER — SUMATRIPTAN 50 MG/1
50 TABLET, FILM COATED ORAL
Qty: 9 TABLET | Refills: 3 | Status: SHIPPED | OUTPATIENT
Start: 2023-11-24 | End: 2024-04-25

## 2023-11-24 ASSESSMENT — ENCOUNTER SYMPTOMS
BREAST MASS: 0
COUGH: 0
SHORTNESS OF BREATH: 0
HEADACHES: 0
SORE THROAT: 0
ABDOMINAL PAIN: 0
ARTHRALGIAS: 0
FEVER: 0
CHILLS: 0
WEAKNESS: 0
CONSTIPATION: 0
JOINT SWELLING: 0
EYE PAIN: 0
NAUSEA: 0
PALPITATIONS: 0
HEMATURIA: 0
HEMATOCHEZIA: 0
NERVOUS/ANXIOUS: 0
DIZZINESS: 0
MYALGIAS: 0
DYSURIA: 0
HEARTBURN: 0
PARESTHESIAS: 0
DIARRHEA: 0
FREQUENCY: 0

## 2023-11-24 NOTE — ASSESSMENT & PLAN NOTE
Patient is status post hysterectomy with OB-GYN approximately one year ago. Surgical history updated today to reflect no additional need for PAPs.

## 2023-11-24 NOTE — PROGRESS NOTES
SUBJECTIVE:   Paula is a 43 year old, presenting for the following:  Physical (fasting)        11/24/2023     7:50 AM   Additional Questions   Roomed by XL     In addition to preventative medicine, she is hoping to complete a medication check related to some new medications that she was started.    She was started on sumatriptan for migraine headaches and she is very happy with the results she has had with this as long as she takes it at the right time. Migraines seem to cycle around hormonal shifts on a monthly basis.    She was also prescribed sertraline, however she began experiencing sexual side effects. At this time, she is not sure she wants to resume medication at this time.    Healthy Habits:     Bi-annual eye exam:  Yes    Dental care twice a year:  Yes    Sleep apnea or symptoms of sleep apnea:  Daytime drowsiness    Diet:  Regular (no restrictions)    Frequency of exercise:  2-3 days/week    Taking medications regularly:  Yes    Medication side effects:  None    Diet 'could be better.' Consistent breakfast and dinner.   Walking the dog is her main form of exercise. Stands all day long at work.     Social History     Tobacco Use    Smoking status: Never    Smokeless tobacco: Never   Substance Use Topics    Alcohol use: Yes     Alcohol/week: 0.0 - 1.0 standard drinks of alcohol     Comment: Just occasionally         11/23/2023     2:10 PM   Alcohol Use   Prescreen: >3 drinks/day or >7 drinks/week? No     Reviewed orders with patient.  Reviewed health maintenance and updated orders accordingly - Yes  Lab work is in process  Labs reviewed in EPIC    BP Readings from Last 3 Encounters:   11/24/23 112/75   06/30/23 118/80   02/06/23 106/60    Wt Readings from Last 3 Encounters:   11/24/23 58.2 kg (128 lb 6.4 oz)   06/30/23 61.9 kg (136 lb 6.4 oz)   02/06/23 62.9 kg (138 lb 11.2 oz)                  Patient Active Problem List   Diagnosis    Tension headache    Chronic fatigue    Pain In The Hands     Preventative health care    Urge and stress incontinence    Cutaneous skin tags    Group B streptococcal bacteriuria    Vitamin D deficiency    Anxiety    Migraine without aura and without status migrainosus, not intractable    Routine general medical examination at a health care facility     Past Surgical History:   Procedure Laterality Date    ENT SURGERY      HC DILATION/CURETTAGE DIAG/THER NON OB      Description: Dilation And Curettage;  Recorded: 2014;    HYSTERECTOMY, PAP NO LONGER INDICATED      NASAL SEPTUM SURGERY         Social History     Tobacco Use    Smoking status: Never    Smokeless tobacco: Never   Substance Use Topics    Alcohol use: Yes     Alcohol/week: 0.0 - 1.0 standard drinks of alcohol     Comment: Just occasionally     Family History   Adopted: Yes   Problem Relation Age of Onset    Unknown/Adopted Other         No family history         Current Outpatient Medications   Medication Sig Dispense Refill    fluticasone (FLONASE) 50 MCG/ACT nasal spray Spray 1-2 sprays into both nostrils once a day as directed      multivitamin w/minerals (THERA-VIT-M) tablet Take 1 tablet by mouth daily      SUMAtriptan (IMITREX) 50 MG tablet Take 1 tablet (50 mg) by mouth at onset of headache for migraine May repeat in 2 hours. Max 4 tablets/24 hours. 9 tablet 3    vitamin D3 (CHOLECALCIFEROL) 1.25 MG (80511 UT) capsule Take 1 capsule (50,000 Units) by mouth every 7 days 12 capsule 3    sertraline (ZOLOFT) 50 MG tablet Take 1 tablet (50 mg) by mouth daily (Patient not taking: Reported on 2023) 90 tablet 0     No Known Allergies    Breast Cancer Screenin/24/2023    10:18 PM   Breast CA Risk Assessment (FHS-7)   Do you have a family history of breast, colon, or ovarian cancer? No / Unknown    No / Unknown    No / Unknown     Mammogram Screening: Recommended annual mammography  Pertinent mammograms are reviewed under the imaging tab.    History of abnormal Pap smear: Status post benign  hysterectomy. Health Maintenance and Surgical History updated.      Latest Ref Rng & Units 10/26/2022     2:45 PM 7/30/2019     3:10 PM 7/11/2014    10:29 AM   PAP / HPV   PAP Negative for squamous intraepithelial lesion or malignancy.  Negative for squamous intraepithelial lesion or malignancy  Electronically signed by Bill Rodríguez CT (ASCP) on 7/31/2019 at  3:00 PM    Negative for squamous intraepithelial lesion or malignancy  Electronically signed by Gardenia Adam CT (ASCP) on 7/23/2014 at 12:04 PM      PAP-ABSTRACT  See Scanned Document             This result is from an external source.     Reviewed and updated as needed this visit by clinical staff   Tobacco  Allergies  Meds  Problems  Med Hx  Surg Hx  Fam Hx          Reviewed and updated as needed this visit by Provider   Tobacco  Allergies  Meds  Problems  Med Hx  Surg Hx  Fam Hx         Review of Systems   Constitutional:  Negative for chills and fever.   HENT:  Negative for congestion, ear pain, hearing loss and sore throat.    Eyes:  Negative for pain and visual disturbance.   Respiratory:  Negative for cough and shortness of breath.    Cardiovascular:  Negative for chest pain, palpitations and peripheral edema.   Gastrointestinal:  Negative for abdominal pain, constipation, diarrhea, heartburn, hematochezia and nausea.   Breasts:  Positive for tenderness. Negative for breast mass and discharge.   Genitourinary:  Positive for pelvic pain. Negative for dysuria, frequency, genital sores, hematuria, urgency, vaginal bleeding and vaginal discharge.   Musculoskeletal:  Negative for arthralgias, joint swelling and myalgias.   Skin:  Negative for rash.   Neurological:  Negative for dizziness, weakness, headaches and paresthesias.   Psychiatric/Behavioral:  Negative for mood changes. The patient is not nervous/anxious.       OBJECTIVE:   /75 (BP Location: Left arm, Patient Position: Sitting, Cuff Size: Adult Regular)   Pulse 91    "Temp 97.9  F (36.6  C) (Oral)   Resp 16   Ht 1.657 m (5' 5.25\")   Wt 58.2 kg (128 lb 6.4 oz)   LMP 08/10/2021 (Approximate)   SpO2 99%   BMI 21.20 kg/m      Physical Exam  GENERAL: healthy, alert and no distress  EYES: Eyes grossly normal to inspection, PERRL and conjunctivae and sclerae normal  HENT: ear canals and TM's normal, nose and mouth without ulcers or lesions  NECK: no adenopathy, no asymmetry, masses, or scars and thyroid normal to palpation  RESP: lungs clear to auscultation - no rales, rhonchi or wheezes  BREAST: Declined by patient due to upcoming mammogram.  CV: regular rate and rhythm, normal S1 S2, no S3 or S4, no murmur, click or rub, no peripheral edema and peripheral pulses strong  ABDOMEN: soft, nontender, no hepatosplenomegaly, no masses and bowel sounds normal  MS: no gross musculoskeletal defects noted, no edema  SKIN: no suspicious lesions or rashes  NEURO: Normal strength and tone, mentation intact and speech normal  PSYCH: mentation appears normal, affect normal/bright    ASSESSMENT/PLAN:     Problem List Items Addressed This Visit       Vitamin D deficiency     Updated labs today. Does intermittently supplement.         Relevant Orders    Vitamin D Deficiency    RESOLVED: Irregular menstrual bleeding     Patient is status post hysterectomy with OB-GYN approximately one year ago. Surgical history updated today to reflect no additional need for PAPs.         Anxiety     Patient reports that she was restarted on sertraline 50 mg a over the summer by her PCP.  Unfortunately, she developed sexual side effects with this medication so she is no longer taking.  Today we discussed options.  She is most comfortable deferring medication management of her mood at this time, she feels as if she has good support and good outlets.  We reviewed that there are are other first-line SSRI medications that could be used if symptoms worsen or she changes her mind. Discussed supportive cares including " diet, exercise, good sleep hygiene and meditation. Follow up as needed.         Migraine without aura and without status migrainosus, not intractable     Patient reports good success with the use of abortive triptan medication which was started a couple months ago by her PCP.  She would like to continue this.  She is using medication at most 2 days a month, but sometimes does have to repeat the dose. We discussed overuse headaches.  Denies any side effects or concerns.  Refill sent today.         Relevant Medications    SUMAtriptan (IMITREX) 50 MG tablet    Routine general medical examination at a health care facility - Primary     Annual exam.  Colonoscopy: Initial screening ordered today.  Mammogram: Ordered.  Pap: No longer indicated.  Surgical history updated to reflect this.  Immunizations: COVID today.  Otherwise, up-to-date.  Labs: Discussed and offered today.  Patient would like to repeat fasting lipids and vitamin D levels, but declines all other labs.  Mood: As documented.  BMI: 21.20. Discussed diet and exercise.  Discussed bone health.  Follow up in one year for annual exam or sooner if needed/indicated.          Other Visit Diagnoses       Screen for colon cancer        Relevant Orders    Colonoscopy Screening  Referral    Need for hepatitis C screening test        Visit for screening mammogram        Relevant Orders    MA SCREENING DIGITAL BILAT - Future  (s+30)    Screening for lipid disorders        Relevant Orders    Lipid panel reflex to direct LDL Fasting          COUNSELING:  Reviewed preventive health counseling, as reflected in patient instructions       Regular exercise       Healthy diet/nutrition       Immunizations  Vaccinated for: Covid-19        Alcohol Use       Osteoporosis prevention/bone health       Colorectal Cancer Screening    She reports that she has never smoked. She has never used smokeless tobacco.    BASHIR Duckworth CNP on 11/24/2023 at 8:57 AM  [facility]  [facility]

## 2023-11-24 NOTE — ASSESSMENT & PLAN NOTE
Annual exam.  Colonoscopy: Initial screening ordered today.  Mammogram: Ordered.  Pap: No longer indicated.  Surgical history updated to reflect this.  Immunizations: COVID today.  Otherwise, up-to-date.  Labs: Discussed and offered today.  Patient would like to repeat fasting lipids and vitamin D levels, but declines all other labs.  Mood: As documented.  BMI: 21.20. Discussed diet and exercise.  Discussed bone health.  Follow up in one year for annual exam or sooner if needed/indicated.

## 2023-11-24 NOTE — ASSESSMENT & PLAN NOTE
Patient reports good success with the use of abortive triptan medication which was started a couple months ago by her PCP.  She would like to continue this.  She is using medication at most 2 days a month, but sometimes does have to repeat the dose. We discussed overuse headaches.  Denies any side effects or concerns.  Refill sent today.

## 2023-11-24 NOTE — ASSESSMENT & PLAN NOTE
Patient reports that she was restarted on sertraline 50 mg a over the summer by her PCP.  Unfortunately, she developed sexual side effects with this medication so she is no longer taking.  Today we discussed options.  She is most comfortable deferring medication management of her mood at this time, she feels as if she has good support and good outlets.  We reviewed that there are are other first-line SSRI medications that could be used if symptoms worsen or she changes her mind. Discussed supportive cares including diet, exercise, good sleep hygiene and meditation. Follow up as needed.

## 2023-11-27 ENCOUNTER — MYC MEDICAL ADVICE (OUTPATIENT)
Dept: FAMILY MEDICINE | Facility: CLINIC | Age: 43
End: 2023-11-27
Payer: COMMERCIAL

## 2023-12-22 ENCOUNTER — TELEPHONE (OUTPATIENT)
Dept: GASTROENTEROLOGY | Facility: CLINIC | Age: 43
End: 2023-12-22
Payer: COMMERCIAL

## 2023-12-22 NOTE — TELEPHONE ENCOUNTER
"Endoscopy Scheduling Screen    Have you had a positive Covid test in the last 14 days?  No    Are you active on MyChart?   Yes    What insurance is in the chart?  Other:  [facility]     Ordering/Referring Provider:     CARMEN DICKINSON       (If ordering provider performs procedure, schedule with ordering provider unless otherwise instructed. )    BMI: Estimated body mass index is 21.2 kg/m  as calculated from the following:    Height as of 11/24/23: 1.657 m (5' 5.25\").    Weight as of 11/24/23: 58.2 kg (128 lb 6.4 oz).     Sedation Ordered  moderate sedation.   If patient BMI > 50 do not schedule in [facility].    If patient BMI > 45 do not schedule at [facility].    Are you taking methadone or Suboxone?  No    Are you taking any prescription medications for pain 3 or more times per week?   NO - No RN review required.    Do you have a history of malignant hyperthermia or adverse reaction to anesthesia?  No    (Females) Are you currently pregnant?   No     Have you been diagnosed or told you have pulmonary hypertension?   No    Do you have an LVAD?  No    Have you been told you have moderate to severe sleep apnea?  No    Have you been told you have COPD, asthma, or any other lung disease?  No    Do you have any heart conditions?  No     Have you ever had an organ transplant?   No    Have you ever had or are you awaiting a heart or lung transplant?   No    Have you had a stroke or transient ischemic attack (TIA aka \"mini stroke\" in the last 6 months?   No    Have you been diagnosed with or been told you have cirrhosis of the liver?   No    Are you currently on dialysis?   No    Do you need assistance transferring?   No    BMI: Estimated body mass index is 21.2 kg/m  as calculated from the following:    Height as of 11/24/23: 1.657 m (5' 5.25\").    Weight as of 11/24/23: 58.2 kg (128 lb 6.4 oz).     Is patients BMI > 40 and scheduling location [facility]?  No    Do you take an injectable medication for weight loss or diabetes (excluding " insulin)?  No    Do you take the medication Naltrexone?  No    Do you take blood thinners?  No       Prep   Are you currently on dialysis or do you have chronic kidney disease?  No    Do you have a diagnosis of diabetes?  No    Do you have a diagnosis of cystic fibrosis (CF)?  No    On a regular basis do you go 3 -5 days between bowel movements?  No    BMI > 40?  No    Preferred Pharmacy:    [id number] - [place], [place] - [address]  [address] [facility] [address]  Phone: [phone] Fax: [phone]      Final Scheduling Details   Colonoscopy prep sent?  Standard MiraLAX    Procedure scheduled  Colonoscopy    Surgeon:  Sara      Date of procedure:  06/05/2024     Pre-OP / PAC:   No - Not required for this site.    Location  [facility] - Per order.    Sedation   MAC/Deep Sedation  per loc       Patient Reminders:   You will receive a call from a Nurse to review instructions and health history.  This assessment must be completed prior to your procedure.  Failure to complete the Nurse assessment may result in the procedure being cancelled.      On the day of your procedure, please designate an adult(s) who can drive you home stay with you for the next 24 hours. The medicines used in the exam will make you sleepy. You will not be able to drive.      You cannot take public transportation, ride share services, or non-medical taxi service without a responsible caregiver.  Medical transport services are allowed with the requirement that a responsible caregiver will receive you at your destination.  We require that drivers and caregivers are confirmed prior to your procedure.

## 2024-01-23 ENCOUNTER — ANCILLARY PROCEDURE (OUTPATIENT)
Dept: GENERAL RADIOLOGY | Facility: CLINIC | Age: 44
End: 2024-01-23
Payer: COMMERCIAL

## 2024-01-23 ENCOUNTER — OFFICE VISIT (OUTPATIENT)
Dept: FAMILY MEDICINE | Facility: CLINIC | Age: 44
End: 2024-01-23
Payer: COMMERCIAL

## 2024-01-23 VITALS
HEART RATE: 88 BPM | OXYGEN SATURATION: 99 % | RESPIRATION RATE: 16 BRPM | DIASTOLIC BLOOD PRESSURE: 71 MMHG | SYSTOLIC BLOOD PRESSURE: 103 MMHG | HEIGHT: 65 IN | TEMPERATURE: 98.9 F | BODY MASS INDEX: 21.69 KG/M2 | WEIGHT: 130.19 LBS

## 2024-01-23 DIAGNOSIS — R05.1 ACUTE COUGH: ICD-10-CM

## 2024-01-23 DIAGNOSIS — R05.1 ACUTE COUGH: Primary | ICD-10-CM

## 2024-01-23 DIAGNOSIS — J02.9 ACUTE PHARYNGITIS, UNSPECIFIED ETIOLOGY: ICD-10-CM

## 2024-01-23 LAB
BASOPHILS # BLD AUTO: 0 10E3/UL (ref 0–0.2)
BASOPHILS NFR BLD AUTO: 0 %
EOSINOPHIL # BLD AUTO: 0.2 10E3/UL (ref 0–0.7)
EOSINOPHIL NFR BLD AUTO: 2 %
ERYTHROCYTE [DISTWIDTH] IN BLOOD BY AUTOMATED COUNT: 12 % (ref 10–15)
HCT VFR BLD AUTO: 41.5 % (ref 35–47)
HGB BLD-MCNC: 14.2 G/DL (ref 11.7–15.7)
IMM GRANULOCYTES # BLD: 0 10E3/UL
IMM GRANULOCYTES NFR BLD: 0 %
LYMPHOCYTES # BLD AUTO: 1.8 10E3/UL (ref 0.8–5.3)
LYMPHOCYTES NFR BLD AUTO: 28 %
MCH RBC QN AUTO: 29.9 PG (ref 26.5–33)
MCHC RBC AUTO-ENTMCNC: 34.2 G/DL (ref 31.5–36.5)
MCV RBC AUTO: 87 FL (ref 78–100)
MONOCYTES # BLD AUTO: 0.4 10E3/UL (ref 0–1.3)
MONOCYTES NFR BLD AUTO: 5 %
MONOCYTES NFR BLD AUTO: NEGATIVE %
NEUTROPHILS # BLD AUTO: 4.3 10E3/UL (ref 1.6–8.3)
NEUTROPHILS NFR BLD AUTO: 64 %
PLATELET # BLD AUTO: 180 10E3/UL (ref 150–450)
RBC # BLD AUTO: 4.75 10E6/UL (ref 3.8–5.2)
WBC # BLD AUTO: 6.7 10E3/UL (ref 4–11)

## 2024-01-23 PROCEDURE — 36415 COLL VENOUS BLD VENIPUNCTURE: CPT

## 2024-01-23 PROCEDURE — 99213 OFFICE O/P EST LOW 20 MIN: CPT

## 2024-01-23 PROCEDURE — 71046 X-RAY EXAM CHEST 2 VIEWS: CPT | Mod: TC | Performed by: STUDENT IN AN ORGANIZED HEALTH CARE EDUCATION/TRAINING PROGRAM

## 2024-01-23 PROCEDURE — 85025 COMPLETE CBC W/AUTO DIFF WBC: CPT

## 2024-01-23 PROCEDURE — 86308 HETEROPHILE ANTIBODY SCREEN: CPT

## 2024-01-23 ASSESSMENT — ENCOUNTER SYMPTOMS: FATIGUE: 1

## 2024-01-23 NOTE — PROGRESS NOTES
Assessment & Plan   Problem List Items Addressed This Visit       Acute cough - Primary     Presenting with 4 weeks of infectious symptoms.  She has been treated appropriately for sinusitis, and today she reports that her symptoms are improved, but remain fluctuating in severity and are persistent.  Her physical exam is very reassuring.  She has no evidence of a secondary bacterial infection such as acute otitis media, and her symptoms are not consistent with a persistent sinusitis and that she is no longer having sinus congestion or drainage.  Will rule out other secondary causes of symptoms including pneumonia and mononucleosis. If these diagnostics are also unrevealing,  I would be most suspicious of back to back viral infections and would recommend continuing supportive cares over the next two weeks. 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 Discussed that if at any time she develops fevers, productive cough, increased nasal drainage/congestion or other new symptoms, she should be reevaluated sooner. Patient expressed an understanding of and agreement with this plan. All questions were answered.         Relevant Orders    CBC with platelets and differential (Completed)    XR Chest 2 Views     Other Visit Diagnoses       Acute pharyngitis, unspecified etiology        Relevant Orders    Mononucleosis screen           Subjective   Paula is a 43 year old, presenting for the following health issues:  Fatigue (ST, ear pain, congestion, cough-dry. Sx continues for one month since Dec 20th. Did get RX for sinusitis in beginning of sx.)        1/23/2024     8:20 AM   Additional Questions   Roomed by sac   Accompanied by self         1/23/2024     8:20 AM   Patient Reported Additional Medications   Patient reports taking the following new medications no     Symptoms started December 20. Was initially a sore throat, minor  congestion.This persisted for a couple of days. She developed conjunctivitis which resolved with eye drops. Then, at the end of the month she developed significant ear pain and jaw pain with worsening congestion. She was treated virtually for a sinus infection at that time and was prescribed augmentin. The Augmentin did help, but she 'never really felt like I was completely over it.' A couple of days after finishing the antibiotic, she had some return of symptoms.    Currently, she is no longer experiencing severe jaw pain. However, she is having significant ear pain, a sore throat, gland sensitivity and a dry cough. She will occasionally have eye drainage/crusting.  All of these symptoms seem to fluctuate in severity during the day. She is also very fatigued.    Denies fevers, chills, gastrointestinal symptoms. The sinus congestion has improved but is still intermittently. She has children who have had various illnesses but they are all middle school/high school aged.    For symptoms, she has been using Flonase, honey, humidifier, steam showers, ibuprofen. She has been supplementing with Zinc and Vitamin C.     History of Present Illness       Reason for visit:  Continued sore throat, ear pain, fatigue for a month  Symptom onset:  More than a month  Symptoms include:  Sore throat, ear pain, eye drainage, cough, fatigue,  Symptom intensity:  Moderate  Symptom progression:  Staying the same  Had these symptoms before:  Yes  Has tried/received treatment for these symptoms:  Yes  Previous treatment was successful:  No  What makes it better:  Taking 3-4 advil helps throat/ear pain    She eats 2-3 servings of fruits and vegetables daily.She consumes 1 sweetened beverage(s) daily.She exercises with enough effort to increase her heart rate 10 to 19 minutes per day.  She exercises with enough effort to increase her heart rate 3 or less days per week.   She is taking medications regularly.       Objective    /71 (BP  "Location: Left arm, Patient Position: Sitting, Cuff Size: Adult Regular)   Pulse 88   Temp 98.9  F (37.2  C) (Oral)   Resp 16   Ht 1.659 m (5' 5.3\")   Wt 59.1 kg (130 lb 3 oz)   LMP 08/10/2021 (Approximate)   SpO2 99%   BMI 21.47 kg/m    Body mass index is 21.47 kg/m .    Physical Exam  Vitals and nursing note reviewed.   Constitutional:       General: She is not in acute distress.     Appearance: Normal appearance. She is not toxic-appearing.   HENT:      Head: Normocephalic.      Right Ear: Tympanic membrane, ear canal and external ear normal.      Left Ear: Tympanic membrane, ear canal and external ear normal.      Nose: Nose normal. No congestion or rhinorrhea.      Mouth/Throat:      Mouth: Mucous membranes are moist.      Pharynx: Posterior oropharyngeal erythema (mild) present. No oropharyngeal exudate.   Eyes:      General:         Right eye: No discharge.         Left eye: No discharge.      Conjunctiva/sclera: Conjunctivae normal.   Cardiovascular:      Rate and Rhythm: Normal rate and regular rhythm.      Heart sounds: Normal heart sounds.   Pulmonary:      Effort: Pulmonary effort is normal. No respiratory distress.      Breath sounds: No stridor. No wheezing or rhonchi.   Abdominal:      General: Abdomen is flat. There is no distension.      Palpations: Abdomen is soft.      Tenderness: There is no abdominal tenderness. There is no guarding.   Musculoskeletal:      Cervical back: Normal range of motion and neck supple. Tenderness present. No rigidity.   Lymphadenopathy:      Cervical: No cervical adenopathy.   Skin:     General: Skin is warm.      Coloration: Skin is not pale.      Findings: No rash.   Neurological:      Mental Status: She is alert.   Psychiatric:         Mood and Affect: Mood normal.         Behavior: Behavior normal.         Thought Content: Thought content normal.        Results for orders placed or performed in visit on 01/23/24 (from the past 24 hour(s))   CBC with " platelets and differential    Narrative    The following orders were created for panel order CBC with platelets and differential.  Procedure                               Abnormality         Status                     ---------                               -----------         ------                     CBC with platelets and d...[[id number]]                      Final result                 Please view results for these tests on the individual orders.   CBC with platelets and differential   Result Value Ref Range    WBC Count 6.7 4.0 - 11.0 10e3/uL    RBC Count 4.75 3.80 - 5.20 10e6/uL    Hemoglobin 14.2 11.7 - 15.7 g/dL    Hematocrit 41.5 35.0 - 47.0 %    MCV 87 78 - 100 fL    MCH 29.9 26.5 - 33.0 pg    MCHC 34.2 31.5 - 36.5 g/dL    RDW 12.0 10.0 - 15.0 %    Platelet Count 180 150 - 450 10e3/uL    % Neutrophils 64 %    % Lymphocytes 28 %    % Monocytes 5 %    % Eosinophils 2 %    % Basophils 0 %    % Immature Granulocytes 0 %    Absolute Neutrophils 4.3 1.6 - 8.3 10e3/uL    Absolute Lymphocytes 1.8 0.8 - 5.3 10e3/uL    Absolute Monocytes 0.4 0.0 - 1.3 10e3/uL    Absolute Eosinophils 0.2 0.0 - 0.7 10e3/uL    Absolute Basophils 0.0 0.0 - 0.2 10e3/uL    Absolute Immature Granulocytes 0.0 <=0.4 10e3/uL           Signed Electronically by: BASHIR Duckworth CNP

## 2024-01-23 NOTE — ASSESSMENT & PLAN NOTE
Presenting with 4 weeks of infectious symptoms.  She has been treated appropriately for sinusitis, and today she reports that her symptoms are improved, but remain fluctuating in severity and are persistent.  Her physical exam is very reassuring.  She has no evidence of a secondary bacterial infection such as acute otitis media, and her symptoms are not consistent with a persistent sinusitis and that she is no longer having sinus congestion or drainage.  Will rule out other secondary causes of symptoms including pneumonia and mononucleosis. If these diagnostics are also unrevealing,  I would be most suspicious of back to back viral infections and would recommend continuing supportive cares over the next two weeks. 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 Discussed that if at any time she develops fevers, productive cough, increased nasal drainage/congestion or other new symptoms, she should be reevaluated sooner. Patient expressed an understanding of and agreement with this plan. All questions were answered.

## 2024-01-25 NOTE — PROGRESS NOTES
"Diana Pierce is a 39 y.o. female who is being evaluated via a billable video visit.      The patient has been notified of following:     \"This video visit will be conducted via a call between you and your physician/provider. We have found that certain health care needs can be provided without the need for an in-person physical exam.  This service lets us provide the care you need with a video conversation.  If a prescription is necessary we can send it directly to your pharmacy.  If lab work is needed we can place an order for that and you can then stop by our lab to have the test done at a later time.    Video visits are billed at different rates depending on your insurance coverage. Please reach out to your insurance provider with any questions.    If during the course of the call the physician/provider feels a video visit is not appropriate, you will not be charged for this service.\"    Patient has given verbal consent to a Video visit? Yes  How would you like to obtain your AVS? AVS Preference: MyChart.  If dropped by the video visit, the video invitation should be sent to: Text to cell phone: [phone]  Will anyone else be joining your video visit? No    Video Start Time: 12:39 PM    Video-Visit Details    Type of service:  Video Visit    Video End Time (time video stopped): 1:01 PM  Originating Location (pt. Location): Home    Distant Location (provider location):  [facility]     Platform used for Video Visit: Matilda Frazier MD     ASSESSMENT AND PLAN:     We spent 42 minutes today in direct patient contact via video conference,w 100% of the time in consultation concerning medical problems as listed below. Phone visit done due to covid 19 crisis.     Problem List Items Addressed This Visit        Unprioritized    Vitamin D deficiency     26 - 1/2020  takin 5k per day.   Vit d recheck now.          Relevant Orders    Vitamin D, Total (25-Hydroxy)    Anxiety - Primary     " Start zoloft 50  Start Vistaril prn  Declined counseling  Fu 1 month to titrate med         Relevant Medications    sertraline (ZOLOFT) 50 MG tablet    hydrOXYzine pamoate (VISTARIL) 25 MG capsule           Chief Complaint   Patient presents with     Test Results     Anxiety     Did PHQ & KARLY online via [employer] yesterday.         HPI  Diana Pierce is a 39 y.o. 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    She is noticing she cannot turn off her brain before bed. Now she is crying more easily. She says she is actually functioning just fine, but she would like something to take the edge off.       Social History     Tobacco Use   Smoking Status Never Smoker   Smokeless Tobacco Never Used      Current Outpatient Medications on File Prior to Visit   Medication Sig Dispense Refill     cholecalciferol, vitamin D3, 5,000 unit capsule TAKE 1 CAPSULE (5,000 UNITS TOTAL) BY MOUTH DAILY. 90 capsule 0     MULTIVITAMIN (MULTIPLE VITAMIN ORAL) Take by mouth.       No current facility-administered medications on file prior to visit.       No Known Allergies    Review of Systems   Constitutional: Negative.    HENT: Negative.    Eyes: Negative.    Respiratory: Negative.    Cardiovascular: Negative.    Gastrointestinal: Negative.    Endocrine: Negative.    Genitourinary: Negative.    Musculoskeletal: Negative.    Skin: Negative.    Neurological: Negative.    Hematological: Negative.    Psychiatric/Behavioral: Negative.         OBJECTIVE: LMP 08/07/2020 (Exact Date)   Breastfeeding No    Physical Exam  Constitutional:       General: She is not in acute distress.     Appearance: She is well-developed.   HENT:      Head: Normocephalic and atraumatic.   Eyes:      Conjunctiva/sclera: Conjunctivae normal.   Neck:      Musculoskeletal: Neck supple.   Pulmonary:      Effort: Pulmonary effort is normal.   Musculoskeletal: Normal range of motion.    Neurological:      Mental Status: She is alert and oriented to person, place, and time.   Psychiatric:         Mood and Affect: Mood normal.         Behavior: Behavior normal.         Thought Content: Thought content normal.         Judgment: Judgment normal.         Additional History from Old Records Summarized (2): no  Decision to Obtain Records (1): no  Radiology Tests Summarized or Ordered (1): no  Labs Reviewed or Ordered (1): yes  Medicine Test Summarized or Ordered (1): yes  Independent Review of EKG or X-RAY(2 each): no    This note was created using Dragon dictation.  Please excuse any grammatical errors.       Dr. Starr

## 2024-02-06 ENCOUNTER — OFFICE VISIT (OUTPATIENT)
Dept: FAMILY MEDICINE | Facility: CLINIC | Age: 44
End: 2024-02-06
Payer: COMMERCIAL

## 2024-02-06 VITALS
SYSTOLIC BLOOD PRESSURE: 123 MMHG | BODY MASS INDEX: 21.75 KG/M2 | OXYGEN SATURATION: 99 % | HEART RATE: 67 BPM | TEMPERATURE: 98.5 F | HEIGHT: 65 IN | DIASTOLIC BLOOD PRESSURE: 78 MMHG | RESPIRATION RATE: 16 BRPM | WEIGHT: 130.56 LBS

## 2024-02-06 DIAGNOSIS — N60.02 CYST OF LEFT BREAST: ICD-10-CM

## 2024-02-06 DIAGNOSIS — N60.01 CYST OF RIGHT BREAST: ICD-10-CM

## 2024-02-06 DIAGNOSIS — Z12.11 SCREEN FOR COLON CANCER: Primary | ICD-10-CM

## 2024-02-06 PROCEDURE — 99213 OFFICE O/P EST LOW 20 MIN: CPT | Performed by: FAMILY MEDICINE

## 2024-02-06 NOTE — ASSESSMENT & PLAN NOTE
Known left breast cyst detected in 2019 on mammography.  This was in the 10 o'clock position on the left breast.  Today this mass is quite large measuring 2 cm in diameter it is smooth mobile and rubbery.  Will obtain diagnostic 3D mammogram due to dense breasts by patient history.  This is likely the same mass that was detected in 2019 although it was larger back then than what I am feeling today.  Await mammography results.

## 2024-02-06 NOTE — PROGRESS NOTES
Assessment & Plan   Problem List Items Addressed This Visit          Other    Cyst of right breast     Patient has a known history of a right benign breast cyst noted 2/16/2023.    Today she came in because she had palpated a lump on the left side and had gone for routine mammogram but because she had a new lump they asked her to come in for an exam prior to doing the mammogram.  Incidentally during the exam it is noted that she has a right side breast cyst in the 10 o'clock position which appears to be approximately ovoid in shape although the exact shape of the lesion is difficult to palpate.  It seems to be 1 cm x 1.5 cm ovoid structure.    3D diagnostic mammography is ordered since the patient does have dense breasts.         Relevant Orders    MA Diagnostic Digital Bilateral    Cyst of left breast     Known left breast cyst detected in 2019 on mammography.  This was in the 10 o'clock position on the left breast.  Today this mass is quite large measuring 2 cm in diameter it is smooth mobile and rubbery.  Will obtain diagnostic 3D mammogram due to dense breasts by patient history.  This is likely the same mass that was detected in 2019 although it was larger back then than what I am feeling today.  Await mammography results.         Relevant Orders    MA Diagnostic Digital Bilateral     Other Visit Diagnoses       Screen for colon cancer    -  Primary               Subjective   Paula is a 43 year old, presenting for the following health issues:  Breast Mass (LT. Pain to the touch. And some tingling burning in both breasts. Sx started in the last week.)        2/6/2024     3:46 PM   Additional Questions   Roomed by sac   Accompanied by self         2/6/2024     3:46 PM   Patient Reported Additional Medications   Patient reports taking the following new medications no     History of Present Illness       Reason for visit:  Lump in breast  Symptom onset:  3-7 days ago    She eats 2-3 servings of fruits and  "vegetables daily.She consumes 1 sweetened beverage(s) daily.She exercises with enough effort to increase her heart rate 20 to 29 minutes per day.  She exercises with enough effort to increase her heart rate 3 or less days per week.   She is taking medications regularly.       No known family history of breast cancer patient is adopted          Objective    /78 (BP Location: Left arm, Patient Position: Sitting, Cuff Size: Adult Regular)   Pulse 67   Temp 98.5  F (36.9  C) (Oral)   Resp 16   Ht 1.659 m (5' 5.3\")   Wt 59.2 kg (130 lb 9 oz)   LMP 08/10/2021 (Approximate)   SpO2 99%   BMI 21.53 kg/m    Body mass index is 21.53 kg/m .  Physical Exam  Constitutional:       Appearance: Normal appearance.   HENT:      Head: Normocephalic and atraumatic.   Cardiovascular:      Rate and Rhythm: Normal rate and regular rhythm.   Pulmonary:      Effort: Pulmonary effort is normal.   Chest:       Musculoskeletal:         General: Normal range of motion.      Cervical back: Normal range of motion and neck supple.   Neurological:      General: No focal deficit present.      Mental Status: She is alert and oriented to person, place, and time.                    Signed Electronically by: Yris Frazier MD    "

## 2024-02-06 NOTE — ASSESSMENT & PLAN NOTE
Patient has a known history of a right benign breast cyst noted 2/16/2023.    Today she came in because she had palpated a lump on the left side and had gone for routine mammogram but because she had a new lump they asked her to come in for an exam prior to doing the mammogram.  Incidentally during the exam it is noted that she has a right side breast cyst in the 10 o'clock position which appears to be approximately ovoid in shape although the exact shape of the lesion is difficult to palpate.  It seems to be 1 cm x 1.5 cm ovoid structure.    3D diagnostic mammography is ordered since the patient does have dense breasts.

## 2024-02-15 ENCOUNTER — ANCILLARY PROCEDURE (OUTPATIENT)
Dept: MAMMOGRAPHY | Facility: CLINIC | Age: 44
End: 2024-02-15
Attending: FAMILY MEDICINE
Payer: COMMERCIAL

## 2024-02-15 DIAGNOSIS — N60.02 CYST OF LEFT BREAST: ICD-10-CM

## 2024-02-15 DIAGNOSIS — N60.01 CYST OF RIGHT BREAST: ICD-10-CM

## 2024-02-15 PROCEDURE — 76642 ULTRASOUND BREAST LIMITED: CPT | Mod: 50

## 2024-02-15 PROCEDURE — 77062 BREAST TOMOSYNTHESIS BI: CPT

## 2024-02-16 ENCOUNTER — MYC MEDICAL ADVICE (OUTPATIENT)
Dept: FAMILY MEDICINE | Facility: CLINIC | Age: 44
End: 2024-02-16
Payer: COMMERCIAL

## 2024-02-16 DIAGNOSIS — Z02.82 ADOPTED: Primary | ICD-10-CM

## 2024-02-27 ENCOUNTER — PATIENT OUTREACH (OUTPATIENT)
Dept: ONCOLOGY | Facility: CLINIC | Age: 44
End: 2024-02-27
Payer: COMMERCIAL

## 2024-02-27 NOTE — PROGRESS NOTES
Writer received referral to Cancer Risk Management/Genetic Counseling.    Referred for:     Adopted - patient has questions        Referral reviewed for appropriate plan, and sent to New Patient Scheduling ([phone]) for completion.    Ivette Acosta, RN, BSN  Oncology New Patient Nurse Navigator   [facility]  [phone]

## 2024-03-11 ENCOUNTER — TELEPHONE (OUTPATIENT)
Dept: FAMILY MEDICINE | Facility: CLINIC | Age: 44
End: 2024-03-11

## 2024-03-11 NOTE — TELEPHONE ENCOUNTER
Patient Quality Outreach    Patient is due for the following:   Colon Cancer Screening    Next Steps:   No follow up needed at this time.    Type of outreach:    Await report- Colon appt. 06/05/24      Questions for provider review:    None           Candie Gonzalez MA

## 2024-03-21 ENCOUNTER — TELEPHONE (OUTPATIENT)
Dept: GASTROENTEROLOGY | Facility: CLINIC | Age: 44
End: 2024-03-21
Payer: COMMERCIAL

## 2024-03-21 NOTE — TELEPHONE ENCOUNTER
VM for Diana Pierce offering sooner date for colonoscopy at [facility].     Patient is currently scheduled 6/5/24.    Availability on 4/2, 4/17 & 4/22 to move patients up.

## 2024-04-25 ENCOUNTER — MYC MEDICAL ADVICE (OUTPATIENT)
Dept: FAMILY MEDICINE | Facility: CLINIC | Age: 44
End: 2024-04-25
Payer: COMMERCIAL

## 2024-04-25 ENCOUNTER — MYC REFILL (OUTPATIENT)
Dept: FAMILY MEDICINE | Facility: CLINIC | Age: 44
End: 2024-04-25
Payer: COMMERCIAL

## 2024-04-25 DIAGNOSIS — G43.009 MIGRAINE WITHOUT AURA AND WITHOUT STATUS MIGRAINOSUS, NOT INTRACTABLE: ICD-10-CM

## 2024-04-28 RX ORDER — SUMATRIPTAN 50 MG/1
50 TABLET, FILM COATED ORAL
Qty: 9 TABLET | Refills: 3 | Status: SHIPPED | OUTPATIENT
Start: 2024-04-28

## 2024-06-04 ENCOUNTER — ANESTHESIA EVENT (OUTPATIENT)
Dept: SURGERY | Facility: AMBULATORY SURGERY CENTER | Age: 44
End: 2024-06-04
Payer: COMMERCIAL

## 2024-06-05 ENCOUNTER — HOSPITAL ENCOUNTER (OUTPATIENT)
Facility: AMBULATORY SURGERY CENTER | Age: 44
Discharge: HOME | End: 2024-06-05
Attending: SURGERY
Payer: COMMERCIAL

## 2024-06-05 ENCOUNTER — ANESTHESIA (OUTPATIENT)
Dept: SURGERY | Facility: AMBULATORY SURGERY CENTER | Age: 44
End: 2024-06-05
Payer: COMMERCIAL

## 2024-06-05 VITALS
HEART RATE: 83 BPM | OXYGEN SATURATION: 99 % | SYSTOLIC BLOOD PRESSURE: 120 MMHG | DIASTOLIC BLOOD PRESSURE: 78 MMHG | RESPIRATION RATE: 14 BRPM | TEMPERATURE: 97.7 F

## 2024-06-05 LAB — COLONOSCOPY: NORMAL

## 2024-06-05 RX ORDER — DEXAMETHASONE SODIUM PHOSPHATE 4 MG/ML
4 INJECTION, SOLUTION INTRA-ARTICULAR; INTRALESIONAL; INTRAMUSCULAR; INTRAVENOUS; SOFT TISSUE
Status: DISCONTINUED | OUTPATIENT
Start: 2024-06-05 | End: 2024-06-06 | Stop reason: HOSPADM

## 2024-06-05 RX ORDER — ONDANSETRON 2 MG/ML
4 INJECTION INTRAMUSCULAR; INTRAVENOUS EVERY 30 MIN PRN
Status: DISCONTINUED | OUTPATIENT
Start: 2024-06-05 | End: 2024-06-06 | Stop reason: HOSPADM

## 2024-06-05 RX ORDER — SODIUM CHLORIDE, SODIUM LACTATE, POTASSIUM CHLORIDE, CALCIUM CHLORIDE 600; 310; 30; 20 MG/100ML; MG/100ML; MG/100ML; MG/100ML
INJECTION, SOLUTION INTRAVENOUS CONTINUOUS
Status: DISCONTINUED | OUTPATIENT
Start: 2024-06-05 | End: 2024-06-06 | Stop reason: HOSPADM

## 2024-06-05 RX ORDER — LIDOCAINE HYDROCHLORIDE 20 MG/ML
INJECTION, SOLUTION INFILTRATION; PERINEURAL PRN
Status: DISCONTINUED | OUTPATIENT
Start: 2024-06-05 | End: 2024-06-05

## 2024-06-05 RX ORDER — ONDANSETRON 4 MG/1
4 TABLET, ORALLY DISINTEGRATING ORAL EVERY 30 MIN PRN
Status: DISCONTINUED | OUTPATIENT
Start: 2024-06-05 | End: 2024-06-06 | Stop reason: HOSPADM

## 2024-06-05 RX ORDER — NALOXONE HYDROCHLORIDE 0.4 MG/ML
0.1 INJECTION, SOLUTION INTRAMUSCULAR; INTRAVENOUS; SUBCUTANEOUS
Status: DISCONTINUED | OUTPATIENT
Start: 2024-06-05 | End: 2024-06-06 | Stop reason: HOSPADM

## 2024-06-05 RX ORDER — PROPOFOL 10 MG/ML
INJECTION, EMULSION INTRAVENOUS PRN
Status: DISCONTINUED | OUTPATIENT
Start: 2024-06-05 | End: 2024-06-05

## 2024-06-05 RX ORDER — GLYCOPYRROLATE 0.2 MG/ML
INJECTION, SOLUTION INTRAMUSCULAR; INTRAVENOUS PRN
Status: DISCONTINUED | OUTPATIENT
Start: 2024-06-05 | End: 2024-06-05

## 2024-06-05 RX ORDER — LIDOCAINE 40 MG/G
CREAM TOPICAL
Status: DISCONTINUED | OUTPATIENT
Start: 2024-06-05 | End: 2024-06-06 | Stop reason: HOSPADM

## 2024-06-05 RX ORDER — PROPOFOL 10 MG/ML
INJECTION, EMULSION INTRAVENOUS CONTINUOUS PRN
Status: DISCONTINUED | OUTPATIENT
Start: 2024-06-05 | End: 2024-06-05

## 2024-06-05 RX ADMIN — PROPOFOL 250 MCG/KG/MIN: 10 INJECTION, EMULSION INTRAVENOUS at 07:00

## 2024-06-05 RX ADMIN — PROPOFOL 5 MG: 10 INJECTION, EMULSION INTRAVENOUS at 07:03

## 2024-06-05 RX ADMIN — LIDOCAINE HYDROCHLORIDE 50 MG: 20 INJECTION, SOLUTION INFILTRATION; PERINEURAL at 06:58

## 2024-06-05 RX ADMIN — GLYCOPYRROLATE 0.2 MG: 0.2 INJECTION, SOLUTION INTRAMUSCULAR; INTRAVENOUS at 06:58

## 2024-06-05 RX ADMIN — SODIUM CHLORIDE, SODIUM LACTATE, POTASSIUM CHLORIDE, CALCIUM CHLORIDE: 600; 310; 30; 20 INJECTION, SOLUTION INTRAVENOUS at 06:47

## 2024-06-05 RX ADMIN — PROPOFOL 25 MG: 10 INJECTION, EMULSION INTRAVENOUS at 07:00

## 2024-06-05 NOTE — ANESTHESIA POSTPROCEDURE EVALUATION
Patient: Diana Pierce    Procedure: Procedure(s):  COLONOSCOPY       Anesthesia Type:  MAC    Note:  Disposition: Outpatient   Postop Pain Control: Uneventful            Sign Out: Well controlled pain   PONV: No   Neuro/Psych: Uneventful            Sign Out: Acceptable/Baseline neuro status   Airway/Respiratory: Uneventful            Sign Out: Acceptable/Baseline resp. status   CV/Hemodynamics: Uneventful            Sign Out: Acceptable CV status; No obvious hypovolemia; No obvious fluid overload   Other NRE: NONE   DID A NON-ROUTINE EVENT OCCUR? No           Last vitals:  Vitals Value Taken Time   /78 06/05/24 0745   Temp 97.7  F (36.5  C) 06/05/24 0718   Pulse 79 06/05/24 0747   Resp 14 06/05/24 0718   SpO2 98 % 06/05/24 0747   Vitals shown include unfiled device data.    Electronically Signed By: Richie Campos MD  June 5, 2024  9:29 AM

## 2024-06-05 NOTE — DISCHARGE INSTRUCTIONS
"Learning About Colonoscopy  What is a colonoscopy?     A colonoscopy is a test (also called a procedure) that lets a doctor look inside your large intestine. The doctor uses a thin, lighted tube called a colonoscope. The doctor uses it to look for small growths called polyps, colon or rectal cancer (colorectal cancer), or other problems like bleeding.  During the procedure, the doctor can take samples of tissue. The samples can then be checked for cancer or other conditions. The doctor can also take out polyps.  How is a colonoscopy done?  This procedure is done in a doctor's office or a clinic or hospital. You will get medicine to help you relax and not feel pain. Some people find that they don't remember having the test because of the medicine.  The doctor gently moves the colonoscope, or scope, through the colon. The scope is also a small video camera. It lets the doctor see the colon and take pictures.  How do you prepare for the procedure?  You need to clean out your colon before the procedure so the doctor can see your colon. This depends on which \"colon prep\" your doctor recommends.  To clean out your colon, you'll do a \"colon prep\" before the test. This means you stop eating solid foods and drink only clear liquids. You can have water, tea, coffee, clear juices, clear broths, flavored ice pops, and gelatin (such as Jell-O). Do not drink anything red or purple.  The day or night before the procedure, you drink a large amount of a special liquid. This causes loose, frequent stools. You will go to the bathroom a lot. Your doctor may have you drink part of the liquid the evening before and the rest on the day of the test. It's very important to drink all of the liquid. If you have problems drinking it, call your doctor.  Arrange to have someone take you home after the test.  What can you expect after a colonoscopy?  Your doctor will tell you when you can eat and do your usual activities.  Drink a lot of fluid " "after the test to replace the fluids you may have lost during the colon prep. But don't drink alcohol.  Your doctor will talk to you about when you'll need your next colonoscopy. The results of your test and your risk for colorectal cancer will help your doctor decide how often you need to be checked.  After the test, you may be bloated or have gas pains. You may need to pass gas. If a biopsy was done or a polyp was removed, you may have streaks of blood in your stool (feces) for a few days. Check with your doctor to see when it is safe to take aspirin and nonsteroidal anti-inflammatory drugs (NSAIDs) again.  Problems such as heavy rectal bleeding may not occur until several weeks after the test. This isn't common. But it can happen after polyps are removed.  Follow-up care is a key part of your treatment and safety. Be sure to make and go to all appointments, and call your doctor if you are having problems. It's also a good idea to know your test results and keep a list of the medicines you take.  Where can you learn more?  Go to https://www.[employer].net/patiented  Enter Z368 in the search box to learn more about \"Learning About Colonoscopy.\"  Current as of: October 25, 2023               Content Version: 14.0    4892-4316 [employer].   Care instructions adapted under license by your healthcare professional. If you have questions about a medical condition or this instruction, always ask your healthcare professional. [employer] disclaims any warranty or liability for your use of this information.        Adult Discharge Orders & Instructions     For 24 hours after surgery    Get plenty of rest.  A responsible adult must stay with you for at least 24 hours after you leave the hospital.     Do not drive or use heavy equipment.  If you have weakness or tingling, don't drive or use heavy equipment until this feeling goes away.    Do not drink alcohol.    Avoid strenuous or risky activities.  " Ask for help when climbing stairs.     You may feel lightheaded.  If so, sit for a few minutes before standing.  Have someone help you get up.     You may have a slight fever. Call the doctor if your fever is over 100 F (37.7 C) (taken under the tongue) or lasts longer than 24 hours.    You may have a dry mouth, a sore throat, muscle aches or trouble sleeping.  These should go away after 24 hours.    Do not make important or legal decisions.        If you have any questions or concerns regarding your procedure, please contact Dr. Ruiz, his office number is [phone].

## 2024-06-05 NOTE — H&P
General Surgery H&P  Diana Pierce MRN# [id number]   Age/Sex: 43 year old female YOB: 1980     Reason for visit: Colonoscopy       Referring physician: Dr. Frazier                    Assessment and Plan:   Assessment:  colonoscopy    Plan:  -To the OR for colonoscopy  -Risk and benefits of procedure explained detail to the patient.  Risks include infection, bleeding, damage to the surrounding structures and possible perforation of the hollow organs.  Patient verbalized understanding provided consent to undergo the procedure above.          Chief Complaint:   Presenting for colonoscopy     History is obtained from the patient    HPI:   Diana Pierce is a 43 year old female who presents for colonoscopy.  Patient tolerated the prep well.  The patient's first colonoscopy.  Family history shows no history of colon cancer.  No new complaints.           Past Medical History:     Past Medical History:   Diagnosis Date    Acute maxillary sinusitis     Acute upper respiratory infections of unspecified site     Generalized abdominal pain 07/30/2019    H/O dilation and curettage     Hemangioma     Created by Conversion  Replacement Utility updated for latest IMO load        Formatting of this note might be different from the original.  Created by Conversion     Replacement Utility updated for latest IMO load     Last Assessment & Plan:   Formatting of this note might be different from the original.  Left inner thigh    Hemangioma of unspecified site     Hyponatremia 01/02/2020    Irregular menstrual bleeding 11/27/2015    Last Assessment & Plan:   Formatting of this note might be different from the original.  Last time we talked she was going to go off the birth control  And she also sees an Obgyn who had put her on the ocp due to menorrhagia. She is considering an ablation, but her obgyn thought she might want to do a few more month of ocp prior to quitting ocp.    Migraine without aura, without mention of  intractable migraine without mention of status migrainosus     Other malaise and fatigue     Pain in limb     PONV (postoperative nausea and vomiting)     Wheezing               Past Surgical History:     Past Surgical History:   Procedure Laterality Date    ENT SURGERY  2004    HC DILATION/CURETTAGE DIAG/THER NON OB      Description: Dilation And Curettage;  Recorded: 07/11/2014;    HYSTERECTOMY, PAP NO LONGER INDICATED      NASAL SEPTUM SURGERY               Social History:    reports that she has never smoked. She has been exposed to tobacco smoke. She has never used smokeless tobacco. She reports current alcohol use. She reports that she does not use drugs.           Family History:     Family History   Adopted: Yes   Problem Relation Age of Onset    Unknown/Adopted Other         No family history              Allergies:   No Known Allergies           Medications:     Prior to Admission medications    Medication Sig Start Date End Date Taking? Authorizing Provider   fluticasone (FLONASE) 50 MCG/ACT nasal spray Spray 1-2 sprays into both nostrils once a day as directed 9/24/22  Yes Reported, Patient   multivitamin w/minerals (THERA-VIT-M) tablet Take 1 tablet by mouth daily   Yes Reported, Patient   SUMAtriptan (IMITREX) 50 MG tablet Take 1 tablet (50 mg) by mouth at onset of headache for migraine May repeat in 2 hours. Max 4 tablets/24 hours. 4/28/24  Yes Yris Frazier MD   vitamin D3 (CHOLECALCIFEROL) 1.25 MG (01736 UT) capsule Take 1 capsule (50,000 Units) by mouth every 7 days 6/30/23  Yes Yris Frazier MD              Review of Systems:   A 12 point Review of Systems is negative other than noted in the HPI            Physical Exam:   Patient Vitals for the past 24 hrs:   BP Temp Temp src Pulse Resp SpO2   06/05/24 0623 139/80 97.2  F (36.2  C) Temporal 94 16 100 %        No intake or output data in the 24 hours ending 06/05/24 0654   Constitutional:   awake, alert, cooperative, no apparent distress,  and appears stated age       Eyes:   PERRL, conjunctiva/corneas clear, EOM's intact; no scleral edema or icterus noted        ENT:   Normocephalic, without obvious abnormality, atraumatic, Lips, mucosa, and tongue normal        Hematologic / Lymphatic:   No lymphadenopathy       Lungs:   Normal respiratory effort, no accessory muscle use, breath sounds bilaterally on auscultation       Cardiovascular:   Regular rate and rhythm       Abdomen:   Soft, nondistended, nontender to palpation       Musculoskeletal:   No obvious swelling, bruising or deformity       Skin:   Skin color and texture normal for patient, no rashes or lesions              Data:          DO Ankush Pham DO  General Surgeon  [facility]  Surgery [address] [address]?  Office: [phone]  Employed by - [facility]  Pager: [phone]

## 2024-06-05 NOTE — ANESTHESIA PREPROCEDURE EVALUATION
Anesthesia Pre-Procedure Evaluation    Patient: Diana Pierce   MRN: [id number] : 1980        Procedure : Procedure(s):  COLONOSCOPY          Past Medical History:   Diagnosis Date    Acute maxillary sinusitis     Acute upper respiratory infections of unspecified site     Generalized abdominal pain 2019    H/O dilation and curettage     Hemangioma     Created by Conversion  Replacement Utility updated for latest IMO load        Formatting of this note might be different from the original.  Created by Conversion     Replacement Utility updated for latest IMO load     Last Assessment & Plan:   Formatting of this note might be different from the original.  Left inner thigh    Hemangioma of unspecified site     Hyponatremia 2020    Irregular menstrual bleeding 2015    Last Assessment & Plan:   Formatting of this note might be different from the original.  Last time we talked she was going to go off the birth control  And she also sees an Obgyn who had put her on the ocp due to menorrhagia. She is considering an ablation, but her obgyn thought she might want to do a few more month of ocp prior to quitting ocp.    Migraine without aura, without mention of intractable migraine without mention of status migrainosus     Other malaise and fatigue     Pain in limb     PONV (postoperative nausea and vomiting)     Wheezing       Past Surgical History:   Procedure Laterality Date    ENT SURGERY      HC DILATION/CURETTAGE DIAG/THER NON OB      Description: Dilation And Curettage;  Recorded: 2014;    HYSTERECTOMY, PAP NO LONGER INDICATED      NASAL SEPTUM SURGERY        No Known Allergies   Social History     Tobacco Use    Smoking status: Never     Passive exposure: Past    Smokeless tobacco: Never   Substance Use Topics    Alcohol use: Yes     Alcohol/week: 0.0 - 1.0 standard drinks of alcohol     Comment: Just occasionally      Wt Readings from Last 1 Encounters:   24 59.2 kg (130 lb 9  "oz)        Anesthesia Evaluation   Pt has had prior anesthetic.     History of anesthetic complications  - PONV.      ROS/MED HX  ENT/Pulmonary:  - neg pulmonary ROS     Neurologic:     (+)      migraines,                          Cardiovascular:  - neg cardiovascular ROS  (-) murmur   METS/Exercise Tolerance:     Hematologic:  - neg hematologic  ROS     Musculoskeletal:  - neg musculoskeletal ROS     GI/Hepatic:     (+)        bowel prep,            Renal/Genitourinary:  - neg Renal ROS     Endo:  - neg endo ROS     Psychiatric/Substance Use:  - neg psychiatric ROS     Infectious Disease:  - neg infectious disease ROS     Malignancy:  - neg malignancy ROS     Other:            Physical Exam    Airway  airway exam normal      Mallampati: II   TM distance: > 3 FB   Neck ROM: full   Mouth opening: > 3 cm    Respiratory Devices and Support         Dental       (+) Completely normal teeth      Cardiovascular   cardiovascular exam normal       Rhythm and rate: regular and normal (-) no murmur    Pulmonary   pulmonary exam normal        breath sounds clear to auscultation           OUTSIDE LABS:  CBC:   Lab Results   Component Value Date    WBC 6.7 01/23/2024    WBC 5.2 08/17/2021    HGB 14.2 01/23/2024    HGB 12.8 08/17/2021    HCT 41.5 01/23/2024    HCT 37.8 08/17/2021     01/23/2024     08/17/2021     BMP:   Lab Results   Component Value Date     08/17/2021     12/31/2019    POTASSIUM 4.3 08/17/2021    POTASSIUM 3.7 12/31/2019    CHLORIDE 104 08/17/2021    CHLORIDE 106 12/31/2019    CO2 25 08/17/2021    CO2 26 12/31/2019    BUN 11 08/17/2021    BUN 13 12/31/2019    CR 0.74 08/17/2021    CR 0.79 12/31/2019    GLC 88 08/17/2021    GLC 87 12/31/2019     COAGS: No results found for: \"PTT\", \"INR\", \"FIBR\"  POC:   Lab Results   Component Value Date    HCG Negative 10/31/2015     HEPATIC:   Lab Results   Component Value Date    ALBUMIN 4.1 08/17/2021    PROTTOTAL 7.2 08/17/2021    ALT 12 08/17/2021    " AST 17 08/17/2021    ALKPHOS 65 08/17/2021    BILITOTAL 0.7 08/17/2021     OTHER:   Lab Results   Component Value Date    RODOLFO 9.9 08/17/2021       Anesthesia Plan    ASA Status:  1    NPO Status:  NPO Appropriate    Anesthesia Type: MAC.     - Reason for MAC: straight local not clinically adequate              Consents    Anesthesia Plan(s) and associated risks, benefits, and realistic alternatives discussed. Questions answered and patient/representative(s) expressed understanding.     - Discussed: Risks, Benefits and Alternatives for BOTH SEDATION and the PROCEDURE were discussed     - Discussed with:  Patient            Postoperative Care    Pain management: IV analgesics, Oral pain medications, Multi-modal analgesia.   PONV prophylaxis: Ondansetron (or other 5HT-3), Background Propofol Infusion, Dexamethasone or Solumedrol     Comments:    Other Comments: Risks of MAC anesthesia including but not limited to recall, awareness, and potential conversion to general anesthesia discussed.           Richie Campos MD    I have reviewed the pertinent notes and labs in the chart from the past 30 days and (re)examined the patient.  Any updates or changes from those notes are reflected in this note.

## 2024-06-05 NOTE — ANESTHESIA CARE TRANSFER NOTE
Patient: Diana Pierce    Procedure: Procedure(s):  COLONOSCOPY       Diagnosis: Screen for colon cancer [Z12.11]  Diagnosis Additional Information: No value filed.    Anesthesia Type:   MAC     Note:    Oropharynx: oropharynx clear of all foreign objects  Level of Consciousness: awake  Oxygen Supplementation: room air    Independent Airway: airway patency satisfactory and stable  Dentition: dentition unchanged  Vital Signs Stable: post-procedure vital signs reviewed and stable  Report to RN Given: handoff report given  Patient transferred to: Phase II    Handoff Report: Identifed the Patient, Identified the Reponsible Provider, Reviewed the pertinent medical history, Discussed the surgical course, Reviewed Intra-OP anesthesia mangement and issues during anesthesia, Set expectations for post-procedure period and Allowed opportunity for questions and acknowledgement of understanding      Vitals:  Vitals Value Taken Time   /80 06/05/24 0718   Temp 97.7  F (36.5  C) 06/05/24 0718   Pulse 80    Resp 14 06/05/24 0718   SpO2 97 % 06/05/24 0718       Electronically Signed By: BASHIR Kenny CRNA  June 5, 2024  7:18 AM

## 2024-06-20 DIAGNOSIS — E55.9 VITAMIN D DEFICIENCY: ICD-10-CM

## 2024-06-20 RX ORDER — CHOLECALCIFEROL (VITAMIN D3) 1250 MCG
1250 CAPSULE ORAL
Qty: 12 CAPSULE | Refills: 0 | Status: SHIPPED | OUTPATIENT
Start: 2024-06-20

## 2024-07-24 ENCOUNTER — MYC MEDICAL ADVICE (OUTPATIENT)
Dept: FAMILY MEDICINE | Facility: CLINIC | Age: 44
End: 2024-07-24
Payer: COMMERCIAL

## 2024-09-30 ENCOUNTER — TRANSFERRED RECORDS (OUTPATIENT)
Dept: HEALTH INFORMATION MANAGEMENT | Facility: CLINIC | Age: 44
End: 2024-09-30
Payer: COMMERCIAL

## 2025-01-11 ENCOUNTER — HEALTH MAINTENANCE LETTER (OUTPATIENT)
Age: 45
End: 2025-01-11

## 2025-02-25 ENCOUNTER — OFFICE VISIT (OUTPATIENT)
Dept: FAMILY MEDICINE | Facility: CLINIC | Age: 45
End: 2025-02-25
Payer: COMMERCIAL

## 2025-02-25 VITALS
RESPIRATION RATE: 16 BRPM | HEIGHT: 65 IN | DIASTOLIC BLOOD PRESSURE: 85 MMHG | SYSTOLIC BLOOD PRESSURE: 125 MMHG | TEMPERATURE: 98 F | HEART RATE: 71 BPM | WEIGHT: 126 LBS | BODY MASS INDEX: 20.99 KG/M2 | OXYGEN SATURATION: 100 %

## 2025-02-25 DIAGNOSIS — N60.01 CYST OF RIGHT BREAST: ICD-10-CM

## 2025-02-25 DIAGNOSIS — N60.02 CYST OF LEFT BREAST: Primary | ICD-10-CM

## 2025-02-25 PROCEDURE — 99213 OFFICE O/P EST LOW 20 MIN: CPT | Performed by: FAMILY MEDICINE

## 2025-02-25 PROCEDURE — 3074F SYST BP LT 130 MM HG: CPT | Performed by: FAMILY MEDICINE

## 2025-02-25 PROCEDURE — 3079F DIAST BP 80-89 MM HG: CPT | Performed by: FAMILY MEDICINE

## 2025-02-25 ASSESSMENT — PATIENT HEALTH QUESTIONNAIRE - PHQ9
10. IF YOU CHECKED OFF ANY PROBLEMS, HOW DIFFICULT HAVE THESE PROBLEMS MADE IT FOR YOU TO DO YOUR WORK, TAKE CARE OF THINGS AT HOME, OR GET ALONG WITH OTHER PEOPLE: SOMEWHAT DIFFICULT
SUM OF ALL RESPONSES TO PHQ QUESTIONS 1-9: 2
SUM OF ALL RESPONSES TO PHQ QUESTIONS 1-9: 2

## 2025-02-25 ASSESSMENT — ANXIETY QUESTIONNAIRES
4. TROUBLE RELAXING: SEVERAL DAYS
GAD7 TOTAL SCORE: 5
7. FEELING AFRAID AS IF SOMETHING AWFUL MIGHT HAPPEN: NOT AT ALL
7. FEELING AFRAID AS IF SOMETHING AWFUL MIGHT HAPPEN: NOT AT ALL
2. NOT BEING ABLE TO STOP OR CONTROL WORRYING: SEVERAL DAYS
GAD7 TOTAL SCORE: 5
1. FEELING NERVOUS, ANXIOUS, OR ON EDGE: SEVERAL DAYS
GAD7 TOTAL SCORE: 5
IF YOU CHECKED OFF ANY PROBLEMS ON THIS QUESTIONNAIRE, HOW DIFFICULT HAVE THESE PROBLEMS MADE IT FOR YOU TO DO YOUR WORK, TAKE CARE OF THINGS AT HOME, OR GET ALONG WITH OTHER PEOPLE: SOMEWHAT DIFFICULT
6. BECOMING EASILY ANNOYED OR IRRITABLE: SEVERAL DAYS
8. IF YOU CHECKED OFF ANY PROBLEMS, HOW DIFFICULT HAVE THESE MADE IT FOR YOU TO DO YOUR WORK, TAKE CARE OF THINGS AT HOME, OR GET ALONG WITH OTHER PEOPLE?: SOMEWHAT DIFFICULT
5. BEING SO RESTLESS THAT IT IS HARD TO SIT STILL: NOT AT ALL
3. WORRYING TOO MUCH ABOUT DIFFERENT THINGS: SEVERAL DAYS

## 2025-02-25 NOTE — PROGRESS NOTES
"  Assessment & Plan  Cyst of left breast  Today comes in for follow up of cysts noted in right and left breast wanting exam prior to her breast imaging since there appear to be two new cysts in the right breast since her last mammogram.     Left breast reveals 1.5cm round cystic smooth rubbery, mobile lesion in the 2 o'clock position  Right breast reveals 1.5cm round cystic smooth rubbery mobile lesion also in the 2 o'clock position  Right breast reveals 1cm round cystic smooth rubbery mobile lesion immediately adjacent to the areola at the 9 o'clock position.  Right breast reveals 8mm round cystic lesion in the 10 o'clock position.    Diagnostic mammogram ordered.     Orders:    MA Diagnostic Bilateral w/ [facility]; Future    Cyst of right breast    Orders:    MA Diagnostic Bilateral w/ [facility]; Future          Subjective   Paula is a 44 year old, presenting for the following health issues:  breast lumps      2/25/2025     4:04 PM   Additional Questions   Roomed by as   Accompanied by self         2/25/2025     4:04 PM   Patient Reported Additional Medications   Patient reports taking the following new medications no     History of Present Illness       Reason for visit:  Lumps in both breasts    She eats 2-3 servings of fruits and vegetables daily.She consumes 1 sweetened beverage(s) daily.She exercises with enough effort to increase her heart rate 20 to 29 minutes per day.  She exercises with enough effort to increase her heart rate 3 or less days per week.   She is taking medications regularly.             Objective    /85 (BP Location: Left arm, Patient Position: Left side, Cuff Size: Adult Regular)   Pulse 71   Temp 98  F (36.7  C) (Oral)   Resp 16   Ht 1.659 m (5' 5.3\")   Wt 57.2 kg (126 lb)   LMP 08/10/2021 (Approximate)   SpO2 100%   BMI 20.78 kg/m    Body mass index is 20.78 kg/m .  Physical Exam  Constitutional:       Appearance: Normal appearance.   HENT:      Head: Normocephalic and atraumatic. "   Cardiovascular:      Rate and Rhythm: Normal rate and regular rhythm.   Pulmonary:      Effort: Pulmonary effort is normal.   Chest:       Musculoskeletal:         General: Normal range of motion.      Cervical back: Normal range of motion and neck supple.   Neurological:      General: No focal deficit present.      Mental Status: She is alert and oriented to person, place, and time.             Signed Electronically by: Yris Frazier MD

## 2025-02-25 NOTE — ASSESSMENT & PLAN NOTE
Today comes in for follow up of cysts noted in right and left breast wanting exam prior to her breast imaging since there appear to be two new cysts in the right breast since her last mammogram.     Left breast reveals 1.5cm round cystic smooth rubbery, mobile lesion in the 2 o'clock position  Right breast reveals 1.5cm round cystic smooth rubbery mobile lesion also in the 2 o'clock position  Right breast reveals 1cm round cystic smooth rubbery mobile lesion immediately adjacent to the areola at the 9 o'clock position.  Right breast reveals 8mm round cystic lesion in the 10 o'clock position.    Diagnostic mammogram ordered.     Orders:    MA Diagnostic Bilateral w/ [facility]; Future

## 2025-03-03 DIAGNOSIS — G43.009 MIGRAINE WITHOUT AURA AND WITHOUT STATUS MIGRAINOSUS, NOT INTRACTABLE: ICD-10-CM

## 2025-03-03 RX ORDER — SUMATRIPTAN 50 MG/1
50 TABLET, FILM COATED ORAL
Qty: 9 TABLET | Refills: 2 | Status: SHIPPED | OUTPATIENT
Start: 2025-03-03

## 2025-03-03 NOTE — TELEPHONE ENCOUNTER
Medication Request  Medication name: SUMAtriptan (IMITREX) 50 MG tablet   Requested Pharmacy:  [facility]  When was patient last seen for this?:  2/25/25  Patient offered appointment:  N/A pharmacy sent request  Okay to leave a detailed message: no

## 2025-03-19 ENCOUNTER — ANCILLARY PROCEDURE (OUTPATIENT)
Dept: MAMMOGRAPHY | Facility: CLINIC | Age: 45
End: 2025-03-19
Attending: FAMILY MEDICINE
Payer: COMMERCIAL

## 2025-03-19 DIAGNOSIS — N60.02 CYST OF LEFT BREAST: ICD-10-CM

## 2025-03-19 DIAGNOSIS — N60.01 CYST OF RIGHT BREAST: ICD-10-CM

## 2025-03-19 PROCEDURE — 77062 BREAST TOMOSYNTHESIS BI: CPT

## 2025-03-19 PROCEDURE — 76642 ULTRASOUND BREAST LIMITED: CPT | Mod: 50

## 2025-06-18 SDOH — HEALTH STABILITY: PHYSICAL HEALTH: ON AVERAGE, HOW MANY DAYS PER WEEK DO YOU ENGAGE IN MODERATE TO STRENUOUS EXERCISE (LIKE A BRISK WALK)?: 3 DAYS

## 2025-06-18 SDOH — HEALTH STABILITY: PHYSICAL HEALTH: ON AVERAGE, HOW MANY MINUTES DO YOU ENGAGE IN EXERCISE AT THIS LEVEL?: 20 MIN

## 2025-06-18 ASSESSMENT — SOCIAL DETERMINANTS OF HEALTH (SDOH): HOW OFTEN DO YOU GET TOGETHER WITH FRIENDS OR RELATIVES?: ONCE A WEEK

## 2025-06-19 ENCOUNTER — PATIENT OUTREACH (OUTPATIENT)
Dept: ONCOLOGY | Facility: CLINIC | Age: 45
End: 2025-06-19

## 2025-06-19 ENCOUNTER — RESULTS FOLLOW-UP (OUTPATIENT)
Dept: FAMILY MEDICINE | Facility: CLINIC | Age: 45
End: 2025-06-19

## 2025-06-19 ENCOUNTER — OFFICE VISIT (OUTPATIENT)
Dept: FAMILY MEDICINE | Facility: CLINIC | Age: 45
End: 2025-06-19
Payer: COMMERCIAL

## 2025-06-19 VITALS
BODY MASS INDEX: 20.23 KG/M2 | HEART RATE: 88 BPM | SYSTOLIC BLOOD PRESSURE: 110 MMHG | WEIGHT: 121.44 LBS | OXYGEN SATURATION: 100 % | RESPIRATION RATE: 12 BRPM | DIASTOLIC BLOOD PRESSURE: 78 MMHG | TEMPERATURE: 98.5 F | HEIGHT: 65 IN

## 2025-06-19 DIAGNOSIS — Z00.00 ROUTINE GENERAL MEDICAL EXAMINATION AT A HEALTH CARE FACILITY: Primary | ICD-10-CM

## 2025-06-19 DIAGNOSIS — F41.9 ANXIETY: ICD-10-CM

## 2025-06-19 DIAGNOSIS — G43.009 MIGRAINE WITHOUT AURA AND WITHOUT STATUS MIGRAINOSUS, NOT INTRACTABLE: ICD-10-CM

## 2025-06-19 DIAGNOSIS — Z13.220 SCREENING FOR LIPID DISORDERS: ICD-10-CM

## 2025-06-19 DIAGNOSIS — N60.19 FIBROCYSTIC BREAST DISEASE (FCBD), UNSPECIFIED LATERALITY: ICD-10-CM

## 2025-06-19 DIAGNOSIS — R07.89 ATYPICAL CHEST PAIN: ICD-10-CM

## 2025-06-19 PROBLEM — R82.71 GROUP B STREPTOCOCCAL BACTERIURIA: Status: RESOLVED | Noted: 2019-10-23 | Resolved: 2025-06-19

## 2025-06-19 PROBLEM — R05.1 ACUTE COUGH: Status: RESOLVED | Noted: 2024-01-23 | Resolved: 2025-06-19

## 2025-06-19 LAB
ALBUMIN SERPL BCG-MCNC: 4.5 G/DL (ref 3.5–5.2)
ALP SERPL-CCNC: 54 U/L (ref 40–150)
ALT SERPL W P-5'-P-CCNC: 12 U/L (ref 0–50)
ANION GAP SERPL CALCULATED.3IONS-SCNC: 8 MMOL/L (ref 7–15)
AST SERPL W P-5'-P-CCNC: 20 U/L (ref 0–45)
ATRIAL RATE - MUSE: 67 BPM
BILIRUB SERPL-MCNC: 0.8 MG/DL
BUN SERPL-MCNC: 13.6 MG/DL (ref 6–20)
CALCIUM SERPL-MCNC: 9.6 MG/DL (ref 8.8–10.4)
CHLORIDE SERPL-SCNC: 105 MMOL/L (ref 98–107)
CHOLEST SERPL-MCNC: 200 MG/DL
CREAT SERPL-MCNC: 0.74 MG/DL (ref 0.51–0.95)
DIASTOLIC BLOOD PRESSURE - MUSE: 78 MMHG
EGFRCR SERPLBLD CKD-EPI 2021: >90 ML/MIN/1.73M2
ERYTHROCYTE [DISTWIDTH] IN BLOOD BY AUTOMATED COUNT: 12.2 % (ref 10–15)
FASTING STATUS PATIENT QL REPORTED: NO
FASTING STATUS PATIENT QL REPORTED: NO
GLUCOSE SERPL-MCNC: 64 MG/DL (ref 70–99)
HCO3 SERPL-SCNC: 25 MMOL/L (ref 22–29)
HCT VFR BLD AUTO: 42.1 % (ref 35–47)
HDLC SERPL-MCNC: 72 MG/DL
HGB BLD-MCNC: 14.6 G/DL (ref 11.7–15.7)
HOLD SPECIMEN: NORMAL
INTERPRETATION ECG - MUSE: NORMAL
LDLC SERPL CALC-MCNC: 111 MG/DL
MAGNESIUM SERPL-MCNC: 2.3 MG/DL (ref 1.7–2.3)
MCH RBC QN AUTO: 30.2 PG (ref 26.5–33)
MCHC RBC AUTO-ENTMCNC: 34.7 G/DL (ref 31.5–36.5)
MCV RBC AUTO: 87 FL (ref 78–100)
NONHDLC SERPL-MCNC: 128 MG/DL
P AXIS - MUSE: 56 DEGREES
PLATELET # BLD AUTO: 209 10E3/UL (ref 150–450)
POTASSIUM SERPL-SCNC: 3.9 MMOL/L (ref 3.4–5.3)
PR INTERVAL - MUSE: 168 MS
PROT SERPL-MCNC: 7.4 G/DL (ref 6.4–8.3)
QRS DURATION - MUSE: 82 MS
QT - MUSE: 400 MS
QTC - MUSE: 422 MS
R AXIS - MUSE: 72 DEGREES
RBC # BLD AUTO: 4.84 10E6/UL (ref 3.8–5.2)
SODIUM SERPL-SCNC: 138 MMOL/L (ref 135–145)
SYSTOLIC BLOOD PRESSURE - MUSE: 110 MMHG
T AXIS - MUSE: 61 DEGREES
TRIGL SERPL-MCNC: 83 MG/DL
TSH SERPL DL<=0.005 MIU/L-ACNC: 1.71 UIU/ML (ref 0.3–4.2)
VENTRICULAR RATE- MUSE: 67 BPM
WBC # BLD AUTO: 5.6 10E3/UL (ref 4–11)

## 2025-06-19 ASSESSMENT — ANXIETY QUESTIONNAIRES
6. BECOMING EASILY ANNOYED OR IRRITABLE: SEVERAL DAYS
7. FEELING AFRAID AS IF SOMETHING AWFUL MIGHT HAPPEN: NOT AT ALL
5. BEING SO RESTLESS THAT IT IS HARD TO SIT STILL: NOT AT ALL
2. NOT BEING ABLE TO STOP OR CONTROL WORRYING: SEVERAL DAYS
IF YOU CHECKED OFF ANY PROBLEMS ON THIS QUESTIONNAIRE, HOW DIFFICULT HAVE THESE PROBLEMS MADE IT FOR YOU TO DO YOUR WORK, TAKE CARE OF THINGS AT HOME, OR GET ALONG WITH OTHER PEOPLE: SOMEWHAT DIFFICULT
3. WORRYING TOO MUCH ABOUT DIFFERENT THINGS: SEVERAL DAYS
GAD7 TOTAL SCORE: 6
1. FEELING NERVOUS, ANXIOUS, OR ON EDGE: MORE THAN HALF THE DAYS
4. TROUBLE RELAXING: SEVERAL DAYS
GAD7 TOTAL SCORE: 6

## 2025-06-19 NOTE — PATIENT INSTRUCTIONS
Patient Education   Preventive Care Advice   This is general advice given by our system to help you stay healthy. However, your care team may have specific advice just for you. Please talk to your care team about your preventive care needs.  Nutrition  Eat 5 or more servings of fruits and vegetables each day.  Try wheat bread, brown rice and whole grain pasta (instead of white bread, rice, and pasta).  Get enough calcium and vitamin D. Check the label on foods and aim for 100% of the RDA (recommended daily allowance).  Lifestyle  Exercise at least 150 minutes each week  (30 minutes a day, 5 days a week).  Do muscle strengthening activities 2 days a week. These help control your weight and prevent disease.  No smoking.  Wear sunscreen to prevent skin cancer.  Have a dental exam and cleaning every 6 months.  Yearly exams  See your health care team every year to talk about:  Any changes in your health.  Any medicines your care team has prescribed.  Preventive care, family planning, and ways to prevent chronic diseases.  Shots (vaccines)   HPV shots (up to age 26), if you've never had them before.  Hepatitis B shots (up to age 59), if you've never had them before.  COVID-19 shot: Get this shot when it's due.  Flu shot: Get a flu shot every year.  Tetanus shot: Get a tetanus shot every 10 years.  Pneumococcal, hepatitis A, and RSV shots: Ask your care team if you need these based on your risk.  Shingles shot (for age 50 and up)  General health tests  Diabetes screening:  Starting at age 35, Get screened for diabetes at least every 3 years.  If you are younger than age 35, ask your care team if you should be screened for diabetes.  Cholesterol test: At age 39, start having a cholesterol test every 5 years, or more often if advised.  Bone density scan (DEXA): At age 50, ask your care team if you should have this scan for osteoporosis (brittle bones).  Hepatitis C: Get tested at least once in your life.  STIs (sexually  transmitted infections)  Before age 24: Ask your care team if you should be screened for STIs.  After age 24: Get screened for STIs if you're at risk. You are at risk for STIs (including HIV) if:  You are sexually active with more than one person.  You don't use condoms every time.  You or a partner was diagnosed with a sexually transmitted infection.  If you are at risk for HIV, ask about PrEP medicine to prevent HIV.  Get tested for HIV at least once in your life, whether you are at risk for HIV or not.  Cancer screening tests  Cervical cancer screening: If you have a cervix, begin getting regular cervical cancer screening tests starting at age 21.  Breast cancer scan (mammogram): If you've ever had breasts, begin having regular mammograms starting at age 40. This is a scan to check for breast cancer.  Colon cancer screening: It is important to start screening for colon cancer at age 45.  Have a colonoscopy test every 10 years (or more often if you're at risk) Or, ask your provider about stool tests like a FIT test every year or Cologuard test every 3 years.  To learn more about your testing options, visit:   .  For help making a decision, visit:   https://bit.ly/it34036.  Prostate cancer screening test: If you have a prostate, ask your care team if a prostate cancer screening test (PSA) at age 55 is right for you.  Lung cancer screening: If you are a current or former smoker ages 50 to 80, ask your care team if ongoing lung cancer screenings are right for you.  For informational purposes only. Not to replace the advice of your health care provider. Copyright   2023 [facility]. All rights reserved. Clinically reviewed by the [facility]. [employer] 903482 - REV 01/24.  Learning About Stress  What is stress?     Stress is your body's response to a hard situation. Your body can have a physical, emotional, or mental response. Stress is a fact of life for most people, and it  affects everyone differently. What causes stress for you may not be stressful for someone else.  A lot of things can cause stress. You may feel stress when you go on a job interview, take a test, or run a race. This kind of short-term stress is normal and even useful. It can help you if you need to work hard or react quickly. For example, stress can help you finish an important job on time.  Long-term stress is caused by ongoing stressful situations or events. Examples of long-term stress include long-term health problems, ongoing problems at work, or conflicts in your family. Long-term stress can harm your health.  How does stress affect your health?  When you are stressed, your body responds as though you are in danger. It makes hormones that speed up your heart, make you breathe faster, and give you a burst of energy. This is called the fight-or-flight stress response. If the stress is over quickly, your body goes back to normal and no harm is done.  But if stress happens too often or lasts too long, it can have bad effects. Long-term stress can make you more likely to get sick, and it can make symptoms of some diseases worse. If you tense up when you are stressed, you may develop neck, shoulder, or low back pain. Stress is linked to high blood pressure and heart disease.  Stress also harms your emotional health. It can make you snell, tense, or depressed. Your relationships may suffer, and you may not do well at work or school.  What can you do to manage stress?  You can try these things to help manage stress:   Do something active. Exercise or activity can help reduce stress. Walking is a great way to get started. Even everyday activities such as housecleaning or yard work can help.  Try yoga or dayton chi. These techniques combine exercise and meditation. You may need some training at first to learn them.  Do something you enjoy. For example, listen to music or go to a movie. Practice your hobby or do volunteer  "work.  Meditate. This can help you relax, because you are not worrying about what happened before or what may happen in the future.  Do guided imagery. Imagine yourself in any setting that helps you feel calm. You can use online videos, books, or a teacher to guide you.  Do breathing exercises. For example:  From a standing position, bend forward from the waist with your knees slightly bent. Let your arms dangle close to the floor.  Breathe in slowly and deeply as you return to a standing position. Roll up slowly and lift your head last.  Hold your breath for just a few seconds in the standing position.  Breathe out slowly and bend forward from the waist.  Let your feelings out. Talk, laugh, cry, and express anger when you need to. Talking with supportive friends or family, a counselor, or a marco leader about your feelings is a healthy way to relieve stress. Avoid discussing your feelings with people who make you feel worse.  Write. It may help to write about things that are bothering you. This helps you find out how much stress you feel and what is causing it. When you know this, you can find better ways to cope.  What can you do to prevent stress?  You might try some of these things to help prevent stress:  Manage your time. This helps you find time to do the things you want and need to do.  Get enough sleep. Your body recovers from the stresses of the day while you are sleeping.  Get support. Your family, friends, and community can make a difference in how you experience stress.  Limit your news feed. Avoid or limit time on social media or news that may make you feel stressed.  Do something active. Exercise or activity can help reduce stress. Walking is a great way to get started.  Where can you learn more?  Go to https://www.GLOG.net/patiented  Enter N032 in the search box to learn more about \"Learning About Stress.\"  Current as of: October 24, 2024  Content Version: 14.5 2024-2025 [employer].   Care instructions adapted under license by your healthcare professional. If you have questions about a medical condition or this instruction, always ask your healthcare professional. [employer] disclaims any warranty or liability for your use of this information.

## 2025-06-19 NOTE — ASSESSMENT & PLAN NOTE
Patient has been off of SSRI therapy for about 2 years with generally good control of symptoms.  She is experiencing this intermittent symptom of chest tightness as documented elsewhere and pending workup, is quite possible this is related to uncontrolled anxiety just simply given risk factors and corresponding life events.  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  She had sexual side effects at high doses (100 mg sertraline) so would like to avoid this if possible.  She will continue with all of her excellent self-care techniques that she has been working on as well.

## 2025-06-19 NOTE — ASSESSMENT & PLAN NOTE
Annual exam.  Mammogram: UTD  PAP: No longer indicated  Colonoscopy: UTD  Immunizations: UTD  Labs: Discussed and offered. Per orders.  Mood: As documented.  BMI: 20.40. Discussed diet and exercise goals.  Bone health: DEXA not indicated. Discussed bone health principles  Contraception: Hysterectomy  Recommend follow up in one year for annual exam or sooner if needed/indicated elsewhere.

## 2025-06-19 NOTE — PROGRESS NOTES
Preventive Care Visit  [facility]  BASHIR Duckworth CNP, Family Medicine  Jun 19, 2025      Assessment & Plan   Assessment & Plan  Routine general medical examination at a health care facility  Annual exam.  Mammogram: UTD  PAP: No longer indicated  Colonoscopy: UTD  Immunizations: UTD  Labs: Discussed and offered. Per orders.  Mood: As documented.  BMI: 20.40. Discussed diet and exercise goals.  Bone health: DEXA not indicated. Discussed bone health principles  Contraception: Hysterectomy  Recommend follow up in one year for annual exam or sooner if needed/indicated elsewhere.       Anxiety  Patient has been off of SSRI therapy for about 2 years with generally good control of symptoms.  She is experiencing this intermittent symptom of chest tightness as documented elsewhere and pending workup, is quite possible this is related to uncontrolled anxiety just simply given risk factors and corresponding life events.  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  She had sexual side effects at high doses (100 mg sertraline) so would like to avoid this if possible.  She will continue with all of her excellent self-care techniques that she has been working on as well.       Fibrocystic breast disease (FCBD), unspecified laterality  Patient was seen back in February with complaints of multiple cystic lesions to the breast.  She had mammography completed at that time which showed numerous, benign appearing cystic lesions.  She reports today that she noticed a new spot to the right breast a few days ago.  Discussed the challenges in terms of how to move forward.  She just had a mammogram which had no suspicious findings, however she did have very dense and cystic tissue on  exam therefore lowering the sensitivity of the mammogram.  I do wonder if she would be a good candidate for MRI imaging as an alternative.  She is interested in discussing next steps and future management with the breast center therefore we will place a referral today.  Orders:    Breast Provider  Referral; Future    Atypical chest pain  Today, patient endorses quite frequent sensations of chest tightness that has been ongoing more chronically, however increasing in frequency in the last few months.  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  She is describing the sensations of tightness with no other associated symptoms.  They are rather random and respond well to deep breathing techniques.  She is not having any exertional symptoms and is able to exercise including walking, Pilates, etc. without any reproduction of the symptoms.  She does not have any overt risk factors for cardiac disease this; cholesterol has been mildly elevated, her blood pressure is well-controlled and her weight is optimal.  She is adopted however, therefore family history is unknown.  We discussed expanding her workup today including EKG and chest x-ray with labs.  Assuming that this workup is benign, given the overall clinical picture we will continue to monitor over the coming months and continue to work on stress and anxiety management.  However, we thoroughly reviewed that if she begins having exertional symptoms, the symptoms are associated with anything else such as difficulty breathing, paresthesias, mental status changes, etc. she needs to be seen immediately.  Orders:    EKG 12-lead, tracing only    XR Chest 2 Views; Future    CBC with Platelets and Reflex to Iron Studies; Future    Comprehensive metabolic panel (BMP + Alb, Alk Phos, ALT,  AST, Total. Bili, TP); Future    TSH with free T4 reflex; Future    Magnesium; Future    Screening for lipid disorders    Orders:    Lipid panel reflex to direct LDL Fasting; Future    Migraine without aura and without status migrainosus, not intractable  Manageable with sumatriptan. Tends to be cyclical where she will need three consecutive doses but outside of that, does not necessitate frequent dosing.        Patient has been advised of split billing requirements and indicates understanding: Yes     Reviewed preventive health counseling, as reflected in patient instructions       Regular exercise       Healthy diet/nutrition       Osteoporosis prevention/bone health    Counseling  Appropriate preventive services were addressed with this patient via screening, questionnaire, or discussion as appropriate for fall prevention, nutrition, physical activity, Tobacco-use cessation, social engagement, weight loss and cognition.  Checklist reviewing preventive services available has been given to the patient.  Reviewed patient's diet, addressing concerns and/or questions.   She is at risk for lack of exercise and has been provided with information to increase physical activity for the benefit of her well-being.   She is at risk for psychosocial distress and has been provided with information to reduce risk.     The longitudinal plan of care for the diagnosis(es)/condition(s) as documented were addressed during this visit. Due to the added complexity in care, I will continue to support Paula in the subsequent management and with ongoing continuity of care.    Subjective   Paula is a 44 year old, presenting for the following:  Physical (Pt. Nonfasting. Hyster-2023 no cervix.), Breast Mass (Noticed 2 days ago.), and Chest Pain (Tightness in chest-has to stop and take deep breaths. Off and on.)        6/19/2025     9:34 AM   Additional Questions   Roomed by sac   Accompanied by self      Patient is a very pleasant 44 year old  presenting today for her annual exam. She has noticed a new cystic lesion in one of her breasts. Additionally, she reports frequent symptoms of chest tightness. 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 However the fact that she is still intermittently experiencing it now that it is summer prompts her questions today. Is happening at least a few times a week. Do not seem to be prompted by anything specific now but was previously moreso at the end of her day when she was able to stop and think about her day for a moment. No associated shortness of breath, paresthesias, headaches, vision changes. Never provoked by exertion. Mindful, deep breathing techniques will generally improve within a few minutes. Has been working on self care techniques without complete resolution of symptoms. Massage therapist has told her that she is extremely tight in the shoulders and upper chest.     Chest Pain     Advance Care Planning    Discussed advance care planning with patient; informed AVS has link to [facility].        6/18/2025   General Health   How would you rate your overall physical health? (!) FAIR   Feel stress (tense, anxious, or unable to sleep) Rather much   (!) STRESS CONCERN      6/18/2025   Nutrition   Three or more servings of calcium each day? (!) NO   Diet: Regular (no restrictions)   How many servings of fruit and vegetables per day? (!) 2-3   How many sweetened beverages each day? 0-1         6/18/2025   Exercise   Days per week of moderate/strenous exercise 3 days   Average minutes spent exercising at this level 20 min         6/18/2025   Social Factors   Frequency of gathering with friends or relatives Once a week   Worry food won't last until get money to buy more No   Food not last or not have enough money for food? No   Do you have housing? (Housing is defined as stable permanent housing and does not include staying  outside in a car, in a tent, in an abandoned building, in an overnight shelter, or couch-surfing.) Yes   Are you worried about losing your housing? No   Lack of transportation? No   Unable to get utilities (heat,electricity)? No         6/18/2025   Dental   Dentist two times every year? Yes         Today's PHQ-2 Score:       2/25/2025     3:29 PM   PHQ-2 ( 1999 [employer])   Q1: Little interest or pleasure in doing things 0   Q2: Feeling down, depressed or hopeless 0   PHQ-2 Score 0    Q1: Little interest or pleasure in doing things Not at all   Q2: Feeling down, depressed or hopeless Not at all   PHQ-2 Score 0       Patient-reported         6/18/2025   Substance Use   Alcohol more than 3/day or more than 7/wk No   Do you use any other substances recreationally? No     Social History     Tobacco Use    Smoking status: Never     Passive exposure: Past    Smokeless tobacco: Never   Vaping Use    Vaping status: Never Used   Substance Use Topics    Alcohol use: Yes     Alcohol/week: 0.0 - 1.0 standard drinks of alcohol     Comment: Just occasionally    Drug use: Never         2/15/2024   LAST FHS-7 RESULTS   1st degree relative breast or ovarian cancer Unknown   Any relative bilateral breast cancer Unknown   Any male have breast cancer Unknown   Any ONE woman have BOTH breast AND ovarian cancer Unknown   Any woman with breast cancer before 50yrs Unknown   2 or more relatives with breast AND/OR ovarian cancer Unknown   2 or more relatives with breast AND/OR bowel cancer Unknown        Mammogram Screening - Mammogram every 1-2 years updated in Health Maintenance based on mutual decision making        6/18/2025   STI Screening   New sexual partner(s) since last STI/HIV test? No     History of abnormal Pap smear: Status post hysterectomy with removal of cervix and no history of CIN2 or greater or cervical cancer. Health Maintenance and Surgical History updated.        Latest Ref Rng & Units 10/26/2022     2:45 PM 7/30/2019      "3:10 PM 7/11/2014    10:29 AM   PAP / HPV   PAP Negative for squamous intraepithelial lesion or malignancy.  Negative for squamous intraepithelial lesion or malignancy  Electronically signed by Bill Rodríguez CT (ASCP) on 7/31/2019 at  3:00 PM    Negative for squamous intraepithelial lesion or malignancy  Electronically signed by Gardenia Adam CT (ASCP) on 7/23/2014 at 12:04 PM      PAP-ABSTRACT  See Scanned Document        ASCVD Risk   The 10-year ASCVD risk score (Lesia BUCKLEY, et al., 2019) is: 1.2%    Values used to calculate the score:      Age: 44 years      Sex: Female      Is Non- : No      Diabetic: No      Tobacco smoker: No      Systolic Blood Pressure: 125 mmHg      Is BP treated: No      HDL Cholesterol: 51 mg/dL      Total Cholesterol: 233 mg/dL      Reviewed and updated as needed this visit by Provider                     Objective    Exam  LMP 08/10/2021 (Approximate)    Estimated body mass index is 20.78 kg/m  as calculated from the following:    Height as of 2/25/25: 1.659 m (5' 5.3\").    Weight as of 2/25/25: 57.2 kg (126 lb).    Physical Exam  GENERAL: alert and no distress  EYES: Eyes grossly normal to inspection, PERRL and conjunctivae and sclerae normal  HENT: ear canals and TM's normal, nose and mouth without ulcers or lesions  NECK: no adenopathy, no asymmetry, masses, or scars  RESP: lungs clear to auscultation - no rales, rhonchi or wheezes  BREAST: normal without masses, tenderness or nipple discharge and no palpable axillary masses or adenopathy  CV: regular rate and rhythm, normal S1 S2, no S3 or S4, no murmur, click or rub, no peripheral edema  ABDOMEN: soft, nontender, no hepatosplenomegaly, no masses and bowel sounds normal  MS: no gross musculoskeletal defects noted, no edema  SKIN: no suspicious lesions or rashes  NEURO: Normal strength and tone, mentation intact and speech normal  PSYCH: mentation appears normal, affect " normal/bright    Signed Electronically by: BASHIR Duckworth CNP

## 2025-06-19 NOTE — PROGRESS NOTES
New Patient Oncology Nurse Navigator Note     Referring provider:     Kristy Ward APRN CNP        Referring Clinic/Organization: [facility]      Referred to (specialty:) Breast Provider Referral     Requested provider (if applicable): NA     Date Referral Received: June 19, 2025     Evaluation for:  N60.19 (ICD-10-CM) - Fibrocystic breast disease (FCBD), unspecified laterality     History of dense tissue, multiple cysts, develop a plan moving forward? MRI imaging appropriate?     Fibrocystic breast disease (FCBD), unspecified laterality  Patient was seen back in February with complaints of multiple cystic lesions to the breast.  She had mammography completed at that time which showed numerous, benign appearing cystic lesions.  She reports today that she noticed a new spot to the right breast a few days ago.  Discussed the challenges in terms of how to move forward.  She just had a mammogram which had no suspicious findings, however she did have very dense and cystic tissue on exam therefore lowering the sensitivity of the mammogram.  I do wonder if she would be a good candidate for MRI imaging as an alternative.  She is interested in discussing next steps and future management with the breast center therefore we will place a referral today.     EXAM: MA DIAGNOSTIC BILATERAL W/ MADAY, US BREAST BILATERAL LIMITED 1-3 QUADRANTS, 3/19/2025 10:40 AM     COMPARISONS: Mammograms 2/15/2024, 11/23/2022, 8/19/2021, 12/18/2019,  ultrasound 2/15/2024, 2/21/2023     HISTORY: Palpable lumps in both breasts     BREAST DENSITY: The breasts are extremely dense, which lowers the  sensitivity of mammography.     FINDINGS: There are multiple benign-appearing oval, circumscribed,  isodense masses in both breasts.      Targeted ultrasound evaluation of both breasts was performed. At the  sites of the patient's palpable concerns in the right breast at the  1:00 position, 5 cm from the nipple, 9:00 and 10:00  positions, 5 cm  from the nipple and the 12:00 position, 3 cm from the nipple, there  are multiple benign cysts. In the left breast at the site of the  patient's palpable concerns at the 12:00 and 1:00 positions, 6 cm from  the nipple, there are multiple additional benign cysts. There are no  suspicious sonographic findings.                                                                      IMPRESSION: BI-RADS CATEGORY: 2 - Benign.     RECOMMENDED FOLLOW-UP: Routine yearly mammography beginning at age 40  or as discussed with your provider.     Records Location: Care Everywhere, Media, and See Bookmarked material     Records Needed: NA     Additional testing needed prior to consult: NA    Payor: [employer] / Plan: [employer] / Product Type: HMO /     June 19, 2025  Referral received and reviewed.   Sent Message to Raven Hernández for further review.     06/19/2025 12:30 PM:  Message received back from Raven Hernández.  Okay to schedule with Raven.  Sent to NPS to schedule.       Nichole BACKN, RN, OCN  Oncology Nurse Navigator   [facility]  Cancer Care Service Line   New Patient Hem/Onc Scheduling / Referrals: [phone] (fax: [phone] )

## 2025-06-19 NOTE — ASSESSMENT & PLAN NOTE
Manageable with sumatriptan. Tends to be cyclical where she will need three consecutive doses but outside of that, does not necessitate frequent dosing.